# Patient Record
Sex: MALE | Race: WHITE | Employment: OTHER | URBAN - METROPOLITAN AREA
[De-identification: names, ages, dates, MRNs, and addresses within clinical notes are randomized per-mention and may not be internally consistent; named-entity substitution may affect disease eponyms.]

---

## 2022-04-07 PROBLEM — I10 HYPERTENSION: Status: ACTIVE | Noted: 2022-04-07

## 2022-04-07 PROBLEM — I21.9 MYOCARDIAL INFARCT (HCC): Status: RESOLVED | Noted: 2022-04-07 | Resolved: 2022-04-07

## 2022-04-07 PROBLEM — Z79.4 TYPE 2 DIABETES MELLITUS, WITH LONG-TERM CURRENT USE OF INSULIN (HCC): Status: ACTIVE | Noted: 2022-04-07

## 2022-04-07 PROBLEM — Z79.4 TYPE 2 DIABETES MELLITUS, WITH LONG-TERM CURRENT USE OF INSULIN (HCC): Chronic | Status: ACTIVE | Noted: 2022-04-07

## 2022-04-07 PROBLEM — N40.0 BPH (BENIGN PROSTATIC HYPERPLASIA): Status: ACTIVE | Noted: 2022-04-07

## 2022-04-07 PROBLEM — K21.9 GERD (GASTROESOPHAGEAL REFLUX DISEASE): Chronic | Status: ACTIVE | Noted: 2022-04-07

## 2022-04-07 PROBLEM — I21.9 MYOCARDIAL INFARCT (HCC): Status: ACTIVE | Noted: 2022-04-07

## 2022-04-07 PROBLEM — I25.10 CAD (CORONARY ARTERY DISEASE): Status: ACTIVE | Noted: 2022-04-07

## 2022-04-07 PROBLEM — E11.9 TYPE 2 DIABETES MELLITUS, WITH LONG-TERM CURRENT USE OF INSULIN (HCC): Status: ACTIVE | Noted: 2022-04-07

## 2022-04-07 PROBLEM — E11.9 TYPE 2 DIABETES MELLITUS, WITH LONG-TERM CURRENT USE OF INSULIN (HCC): Chronic | Status: ACTIVE | Noted: 2022-04-07

## 2022-04-07 PROBLEM — N18.4 CKD (CHRONIC KIDNEY DISEASE) STAGE 4, GFR 15-29 ML/MIN (HCC): Status: ACTIVE | Noted: 2022-04-07

## 2022-04-07 PROBLEM — K21.9 GERD (GASTROESOPHAGEAL REFLUX DISEASE): Chronic | Status: RESOLVED | Noted: 2022-04-07 | Resolved: 2022-04-07

## 2022-04-07 PROBLEM — K21.9 GERD (GASTROESOPHAGEAL REFLUX DISEASE): Status: ACTIVE | Noted: 2022-04-07

## 2022-04-07 PROBLEM — N18.4 CKD (CHRONIC KIDNEY DISEASE) STAGE 4, GFR 15-29 ML/MIN (HCC): Chronic | Status: ACTIVE | Noted: 2022-04-07

## 2022-04-07 PROBLEM — E78.2 MIXED HYPERLIPIDEMIA: Chronic | Status: ACTIVE | Noted: 2022-04-07

## 2022-04-07 PROBLEM — N40.0 BPH (BENIGN PROSTATIC HYPERPLASIA): Chronic | Status: ACTIVE | Noted: 2022-04-07

## 2022-04-07 PROBLEM — E78.2 MIXED HYPERLIPIDEMIA: Status: ACTIVE | Noted: 2022-04-07

## 2022-04-07 PROBLEM — I10 HYPERTENSION: Chronic | Status: ACTIVE | Noted: 2022-04-07

## 2022-04-07 PROBLEM — D64.9 ANEMIA: Chronic | Status: ACTIVE | Noted: 2022-04-07

## 2022-04-07 PROBLEM — N28.1 CYST OF LEFT KIDNEY: Status: ACTIVE | Noted: 2022-04-07

## 2022-04-07 PROBLEM — I25.10 CAD (CORONARY ARTERY DISEASE): Chronic | Status: ACTIVE | Noted: 2022-04-07

## 2022-04-07 PROBLEM — R11.10 CHRONIC VOMITING: Status: ACTIVE | Noted: 2022-04-07

## 2022-04-07 PROBLEM — D64.9 ANEMIA: Status: ACTIVE | Noted: 2022-04-07

## 2022-05-19 PROBLEM — R10.11 RUQ ABDOMINAL PAIN: Status: ACTIVE | Noted: 2022-05-19

## 2022-05-25 PROBLEM — Z79.4 INSULIN LONG-TERM USE (HCC): Chronic | Status: ACTIVE | Noted: 2022-05-25

## 2022-06-01 PROBLEM — N18.6 ESRD (END STAGE RENAL DISEASE) (HCC): Status: ACTIVE | Noted: 2022-06-01

## 2022-07-14 PROBLEM — T85.691A MECHANICAL COMPLICATION DUE TO PERITONEAL DIALYSIS CATHETER (HCC): Status: ACTIVE | Noted: 2022-07-14

## 2022-10-06 PROBLEM — I21.A1 TYPE 2 MYOCARDIAL INFARCTION (HCC): Status: ACTIVE | Noted: 2022-04-07

## 2023-01-31 ENCOUNTER — HOSPITAL ENCOUNTER (OUTPATIENT)
Dept: CARDIAC CATH/INVASIVE PROCEDURES | Age: 53
Discharge: HOME | End: 2023-02-01
Attending: INTERNAL MEDICINE | Admitting: INTERNAL MEDICINE
Payer: COMMERCIAL

## 2023-01-31 DIAGNOSIS — J06.9 UPPER RESPIRATORY TRACT INFECTION, UNSPECIFIED TYPE: ICD-10-CM

## 2023-01-31 DIAGNOSIS — R05.1 ACUTE COUGH: ICD-10-CM

## 2023-01-31 DIAGNOSIS — R11.10 CHRONIC VOMITING: ICD-10-CM

## 2023-01-31 DIAGNOSIS — E78.2 MIXED HYPERLIPIDEMIA: Chronic | ICD-10-CM

## 2023-01-31 DIAGNOSIS — I25.10 CORONARY ARTERY DISEASE INVOLVING NATIVE HEART WITHOUT ANGINA PECTORIS, UNSPECIFIED VESSEL OR LESION TYPE: Chronic | ICD-10-CM

## 2023-01-31 PROBLEM — Z95.5 S/P PRIMARY ANGIOPLASTY WITH CORONARY STENT: Status: ACTIVE | Noted: 2023-01-31

## 2023-01-31 LAB
ACTIVATED CLOTTING TIME: 271 SEC (ref 79–149)
EGFR, POC: 11 ML/MIN/1.73M2
GLUCOSE BLD-MCNC: 103 MG/DL (ref 75–110)
GLUCOSE BLD-MCNC: 138 MG/DL (ref 74–100)
GLUCOSE BLD-MCNC: 223 MG/DL (ref 75–110)
HCT VFR BLD CALC: 45.7 % (ref 40.7–50.3)
HEMOGLOBIN: 14.4 G/DL (ref 13–17)
PLATELET # BLD: 244 K/UL (ref 138–453)
POC BUN: 42 MG/DL (ref 8–26)
POC CHLORIDE: 108 MMOL/L (ref 98–107)
POC CREATININE: 6.04 MG/DL (ref 0.51–1.19)
POC HEMATOCRIT: 49 % (ref 41–53)
POC HEMOGLOBIN: 16.5 G/DL (ref 13.5–17.5)
POC POTASSIUM: 4.2 MMOL/L (ref 3.5–4.5)
POC SODIUM: 140 MMOL/L (ref 138–146)

## 2023-01-31 PROCEDURE — 82435 ASSAY OF BLOOD CHLORIDE: CPT

## 2023-01-31 PROCEDURE — 99153 MOD SED SAME PHYS/QHP EA: CPT

## 2023-01-31 PROCEDURE — 7100000001 HC PACU RECOVERY - ADDTL 15 MIN

## 2023-01-31 PROCEDURE — 85018 HEMOGLOBIN: CPT

## 2023-01-31 PROCEDURE — 85347 COAGULATION TIME ACTIVATED: CPT

## 2023-01-31 PROCEDURE — C1894 INTRO/SHEATH, NON-LASER: HCPCS

## 2023-01-31 PROCEDURE — 6360000002 HC RX W HCPCS

## 2023-01-31 PROCEDURE — 84132 ASSAY OF SERUM POTASSIUM: CPT

## 2023-01-31 PROCEDURE — 80048 BASIC METABOLIC PNL TOTAL CA: CPT

## 2023-01-31 PROCEDURE — 99152 MOD SED SAME PHYS/QHP 5/>YRS: CPT

## 2023-01-31 PROCEDURE — 84295 ASSAY OF SERUM SODIUM: CPT

## 2023-01-31 PROCEDURE — 7100000000 HC PACU RECOVERY - FIRST 15 MIN

## 2023-01-31 PROCEDURE — 6370000000 HC RX 637 (ALT 250 FOR IP): Performed by: STUDENT IN AN ORGANIZED HEALTH CARE EDUCATION/TRAINING PROGRAM

## 2023-01-31 PROCEDURE — C1874 STENT, COATED/COV W/DEL SYS: HCPCS

## 2023-01-31 PROCEDURE — 6360000002 HC RX W HCPCS: Performed by: STUDENT IN AN ORGANIZED HEALTH CARE EDUCATION/TRAINING PROGRAM

## 2023-01-31 PROCEDURE — 92921 HC PRQ CARDIAC ANGIO ADDL ART: CPT

## 2023-01-31 PROCEDURE — C1760 CLOSURE DEV, VASC: HCPCS

## 2023-01-31 PROCEDURE — C1725 CATH, TRANSLUMIN NON-LASER: HCPCS

## 2023-01-31 PROCEDURE — 36415 COLL VENOUS BLD VENIPUNCTURE: CPT

## 2023-01-31 PROCEDURE — 6360000004 HC RX CONTRAST MEDICATION

## 2023-01-31 PROCEDURE — 85014 HEMATOCRIT: CPT

## 2023-01-31 PROCEDURE — 93458 L HRT ARTERY/VENTRICLE ANGIO: CPT

## 2023-01-31 PROCEDURE — 84484 ASSAY OF TROPONIN QUANT: CPT

## 2023-01-31 PROCEDURE — 6370000000 HC RX 637 (ALT 250 FOR IP)

## 2023-01-31 PROCEDURE — 92928 PRQ TCAT PLMT NTRAC ST 1 LES: CPT

## 2023-01-31 PROCEDURE — 85049 AUTOMATED PLATELET COUNT: CPT

## 2023-01-31 PROCEDURE — C1887 CATHETER, GUIDING: HCPCS

## 2023-01-31 PROCEDURE — 82565 ASSAY OF CREATININE: CPT

## 2023-01-31 PROCEDURE — 2709999900 HC NON-CHARGEABLE SUPPLY

## 2023-01-31 PROCEDURE — C1892 INTRO/SHEATH,FIXED,PEEL-AWAY: HCPCS

## 2023-01-31 PROCEDURE — C1769 GUIDE WIRE: HCPCS

## 2023-01-31 PROCEDURE — 82947 ASSAY GLUCOSE BLOOD QUANT: CPT

## 2023-01-31 PROCEDURE — 84520 ASSAY OF UREA NITROGEN: CPT

## 2023-01-31 RX ORDER — INSULIN LISPRO 100 [IU]/ML
0-4 INJECTION, SOLUTION INTRAVENOUS; SUBCUTANEOUS NIGHTLY
Status: DISCONTINUED | OUTPATIENT
Start: 2023-01-31 | End: 2023-02-01 | Stop reason: HOSPADM

## 2023-01-31 RX ORDER — INSULIN LISPRO 100 [IU]/ML
0-8 INJECTION, SOLUTION INTRAVENOUS; SUBCUTANEOUS
Status: DISCONTINUED | OUTPATIENT
Start: 2023-02-01 | End: 2023-02-01 | Stop reason: HOSPADM

## 2023-01-31 RX ORDER — TIMOLOL MALEATE 5 MG/ML
1 SOLUTION/ DROPS OPHTHALMIC DAILY
Status: DISCONTINUED | OUTPATIENT
Start: 2023-02-01 | End: 2023-02-01 | Stop reason: HOSPADM

## 2023-01-31 RX ORDER — SODIUM CHLORIDE 0.9 % (FLUSH) 0.9 %
5-40 SYRINGE (ML) INJECTION PRN
Status: DISCONTINUED | OUTPATIENT
Start: 2023-01-31 | End: 2023-02-01 | Stop reason: HOSPADM

## 2023-01-31 RX ORDER — SEVELAMER CARBONATE 800 MG/1
800 TABLET, FILM COATED ORAL
Status: DISCONTINUED | OUTPATIENT
Start: 2023-02-01 | End: 2023-02-01 | Stop reason: HOSPADM

## 2023-01-31 RX ORDER — FUROSEMIDE 40 MG/1
40 TABLET ORAL NIGHTLY
Status: DISCONTINUED | OUTPATIENT
Start: 2023-01-31 | End: 2023-02-01 | Stop reason: HOSPADM

## 2023-01-31 RX ORDER — NITROGLYCERIN 0.4 MG/1
0.4 TABLET SUBLINGUAL EVERY 5 MIN PRN
Status: DISCONTINUED | OUTPATIENT
Start: 2023-01-31 | End: 2023-02-01 | Stop reason: HOSPADM

## 2023-01-31 RX ORDER — CARVEDILOL 25 MG/1
25 TABLET ORAL 2 TIMES DAILY
Status: DISCONTINUED | OUTPATIENT
Start: 2023-01-31 | End: 2023-02-01 | Stop reason: HOSPADM

## 2023-01-31 RX ORDER — SODIUM CHLORIDE 0.9 % (FLUSH) 0.9 %
5-40 SYRINGE (ML) INJECTION EVERY 12 HOURS SCHEDULED
Status: DISCONTINUED | OUTPATIENT
Start: 2023-01-31 | End: 2023-02-01 | Stop reason: HOSPADM

## 2023-01-31 RX ORDER — VITAMIN B COMPLEX
5000 TABLET ORAL DAILY
Status: DISCONTINUED | OUTPATIENT
Start: 2023-02-01 | End: 2023-02-01 | Stop reason: HOSPADM

## 2023-01-31 RX ORDER — SODIUM CHLORIDE 9 MG/ML
INJECTION, SOLUTION INTRAVENOUS CONTINUOUS
Status: DISCONTINUED | OUTPATIENT
Start: 2023-01-31 | End: 2023-02-01 | Stop reason: HOSPADM

## 2023-01-31 RX ORDER — OMEGA-3-ACID ETHYL ESTERS 1 G/1
1 CAPSULE, LIQUID FILLED ORAL DAILY
Status: DISCONTINUED | OUTPATIENT
Start: 2023-02-01 | End: 2023-02-01 | Stop reason: HOSPADM

## 2023-01-31 RX ORDER — SPIRONOLACTONE 25 MG/1
25 TABLET ORAL DAILY
Status: DISCONTINUED | OUTPATIENT
Start: 2023-02-01 | End: 2023-02-01 | Stop reason: HOSPADM

## 2023-01-31 RX ORDER — ASPIRIN 81 MG/1
81 TABLET ORAL DAILY
Status: DISCONTINUED | OUTPATIENT
Start: 2023-02-01 | End: 2023-02-01 | Stop reason: HOSPADM

## 2023-01-31 RX ORDER — HYDRALAZINE HYDROCHLORIDE 20 MG/ML
10 INJECTION INTRAMUSCULAR; INTRAVENOUS EVERY 6 HOURS PRN
Status: DISCONTINUED | OUTPATIENT
Start: 2023-01-31 | End: 2023-02-01 | Stop reason: HOSPADM

## 2023-01-31 RX ORDER — PANTOPRAZOLE SODIUM 40 MG/1
40 TABLET, DELAYED RELEASE ORAL DAILY
Status: DISCONTINUED | OUTPATIENT
Start: 2023-02-01 | End: 2023-02-01 | Stop reason: HOSPADM

## 2023-01-31 RX ORDER — INSULIN GLARGINE 100 [IU]/ML
35 INJECTION, SOLUTION SUBCUTANEOUS NIGHTLY
Status: DISCONTINUED | OUTPATIENT
Start: 2023-01-31 | End: 2023-02-01 | Stop reason: HOSPADM

## 2023-01-31 RX ORDER — DEXTROSE MONOHYDRATE 100 MG/ML
INJECTION, SOLUTION INTRAVENOUS CONTINUOUS PRN
Status: DISCONTINUED | OUTPATIENT
Start: 2023-01-31 | End: 2023-02-01 | Stop reason: HOSPADM

## 2023-01-31 RX ORDER — ONDANSETRON 2 MG/ML
4 INJECTION INTRAMUSCULAR; INTRAVENOUS EVERY 6 HOURS PRN
Status: DISCONTINUED | OUTPATIENT
Start: 2023-01-31 | End: 2023-02-01 | Stop reason: HOSPADM

## 2023-01-31 RX ORDER — CLOPIDOGREL BISULFATE 75 MG/1
75 TABLET ORAL DAILY
Status: DISCONTINUED | OUTPATIENT
Start: 2023-02-01 | End: 2023-02-01 | Stop reason: HOSPADM

## 2023-01-31 RX ORDER — FUROSEMIDE 40 MG/1
80 TABLET ORAL DAILY
Status: DISCONTINUED | OUTPATIENT
Start: 2023-02-01 | End: 2023-02-01 | Stop reason: HOSPADM

## 2023-01-31 RX ORDER — NIFEDIPINE 90 MG/1
90 TABLET, EXTENDED RELEASE ORAL DAILY
Status: DISCONTINUED | OUTPATIENT
Start: 2023-02-01 | End: 2023-02-01 | Stop reason: HOSPADM

## 2023-01-31 RX ORDER — ATORVASTATIN CALCIUM 80 MG/1
80 TABLET, FILM COATED ORAL DAILY
Status: DISCONTINUED | OUTPATIENT
Start: 2023-01-31 | End: 2023-02-01 | Stop reason: HOSPADM

## 2023-01-31 RX ORDER — SODIUM CHLORIDE 9 MG/ML
INJECTION, SOLUTION INTRAVENOUS PRN
Status: DISCONTINUED | OUTPATIENT
Start: 2023-01-31 | End: 2023-02-01 | Stop reason: HOSPADM

## 2023-01-31 RX ADMIN — SODIUM CHLORIDE: 9 INJECTION, SOLUTION INTRAVENOUS at 12:08

## 2023-01-31 RX ADMIN — CARVEDILOL 25 MG: 25 TABLET, FILM COATED ORAL at 23:00

## 2023-01-31 RX ADMIN — INSULIN GLARGINE 35 UNITS: 100 INJECTION, SOLUTION SUBCUTANEOUS at 23:06

## 2023-01-31 RX ADMIN — HYDROMORPHONE HYDROCHLORIDE 0.25 MG: 1 INJECTION, SOLUTION INTRAMUSCULAR; INTRAVENOUS; SUBCUTANEOUS at 23:06

## 2023-01-31 RX ADMIN — FUROSEMIDE 40 MG: 40 TABLET ORAL at 23:00

## 2023-01-31 ASSESSMENT — PAIN - FUNCTIONAL ASSESSMENT: PAIN_FUNCTIONAL_ASSESSMENT: PREVENTS OR INTERFERES SOME ACTIVE ACTIVITIES AND ADLS

## 2023-01-31 ASSESSMENT — PAIN SCALES - GENERAL
PAINLEVEL_OUTOF10: 8
PAINLEVEL_OUTOF10: 0

## 2023-01-31 ASSESSMENT — PAIN DESCRIPTION - LOCATION: LOCATION: BACK;GROIN

## 2023-01-31 ASSESSMENT — PAIN DESCRIPTION - ONSET: ONSET: PROGRESSIVE

## 2023-01-31 ASSESSMENT — PAIN DESCRIPTION - PAIN TYPE: TYPE: SURGICAL PAIN

## 2023-01-31 ASSESSMENT — PAIN DESCRIPTION - DESCRIPTORS: DESCRIPTORS: DISCOMFORT

## 2023-01-31 ASSESSMENT — PAIN DESCRIPTION - FREQUENCY: FREQUENCY: CONTINUOUS

## 2023-01-31 ASSESSMENT — PAIN DESCRIPTION - ORIENTATION: ORIENTATION: RIGHT

## 2023-01-31 NOTE — H&P
Attestation signed by      Attending Physician Statement:    I have discussed the care of  Carly Soliman , including pertinent history and exam findings, with the Cardiology fellow/resident. I have seen and examined the patient and the key elements of all parts of the encounter have been performed by me. I agree with the assessment, plan and orders as documented by the fellow/resident, after I modified exam findings and plan of treatments, and the final version is my approved version of the assessment. Additional Comments: [facility]    Consult / H&P               Today's Date: 1/31/2023  Patient Name: Carly Soliman  Date of admission: No admission date for patient encounter. Patient's age: 47 y. o., 1970  Admission Dx: No admission diagnoses are documented for this encounter. Reason for Consult:  Cardiac evaluation    Requesting Physician: No admitting provider for patient encounter. CHIEF COMPLAINT:  Elective cath     History Obtained From:  patient    HISTORY OF PRESENT ILLNESS:      The patient is a 47 y.o. is here for elective Cath/PCI       Past Medical History:   has a past medical history of Anemia, BPH (benign prostatic hyperplasia), CAD (coronary artery disease), Cerebral artery occlusion with cerebral infarction ([address] Chronic vomiting, CKD (chronic kidney disease) stage 4, GFR 15-29 ml/min ([address] Cyst of left kidney, Hypertension, Injection of surface of eye, right, Mixed hyperlipidemia, Pneumonia, and Type 2 diabetes mellitus, with long-term current use of insulin ([address] Past Surgical History:   has a past surgical history that includes Cholecystectomy; Carpal tunnel release (Left); vascular surgery (N/A, 05/26/2022); Dialysis Catheter Insertion (N/A, 06/21/2022); Dialysis Catheter Insertion (N/A, 07/18/2022); and Cardiac catheterization (10/03/2022).      Home Medications:    Prior to Admission medications    Medication Sig Start Date End Date Taking? Authorizing Provider   NIFEdipine (ADALAT CC) 90 MG extended release tablet Take by mouth daily 1/23/23   Malina Little DO   sacubitril-valsartan (ENTRESTO)  MG per tablet Take 1 tablet by mouth 2 times daily 1/16/23   Lakshmi Moscoso MD   spironolactone (ALDACTONE) 25 MG tablet Take 1 tablet by mouth daily 12/27/22   Lakshmi Moscoso MD   atorvastatin (LIPITOR) 80 MG tablet Take 1 tablet by mouth daily 12/27/22   Lakshmi Moscoso MD   clopidogrel (PLAVIX) 75 MG tablet Take 1 tablet by mouth daily 12/27/22   TELLO Downing CNP   insulin aspart (NOVOLOG FLEXPEN) 100 UNIT/ML injection pen use per sliding scale three times a day with meals.   Maximum 30 units/day 12/27/22   TELLO Downing CNP   insulin detemir (LEVEMIR FLEXTOUCH) 100 UNIT/ML injection pen Inject 35 Units into the skin nightly 12/27/22   TELLO Downing CNP   Insulin Pen Needle (BD PEN NEEDLE BILL U/F) 32G X 4 MM MISC 1 each by Does not apply route daily 12/27/22   TELLO Downing CNP   isosorbide mononitrate (IMDUR) 30 MG extended release tablet Take 3 tablets by mouth daily 12/27/22   TELLO Downing CNP   pantoprazole (PROTONIX) 40 MG tablet Take 1 tablet by mouth daily 12/27/22   TELLO Downing CNP   furosemide (LASIX) 40 MG tablet Take 40 mg by mouth nightly    Malina Little DO   Icosapent Ethyl (VASCEPA) 1 g CAPS capsule Take 2 capsules by mouth 2 times daily 12/5/22   Lakshmi Moscoso MD   carvedilol (COREG) 25 MG tablet Take 1 tablet by mouth 2 times daily 11/10/22   Lakshmi Moscoso MD   vitamin D3 (CHOLECALCIFEROL) 125 MCG (5000 UT) TABS tablet Take 1 capsule by mouth as needed    Historical Provider, MD   albuterol sulfate HFA (VENTOLIN HFA) 108 (90 Base) MCG/ACT inhaler Inhale 1-2 puffs into the lungs every 4 hours as needed for Wheezing or Shortness of Breath 10/25/22   TELLO Downing - CNP   b complex-C-folic acid (NEPHROCAPS) 1 MG capsule Take 1 mg by mouth daily 10/5/22   Historical Provider, MD   nitroGLYCERIN (NITROSTAT) 0.4 MG SL tablet Place 1 (ONE) tablet under tongue every 5 (FIVE) minutes as needed for Chest pain. max = 3 (THREE) doses. If chest pain persists after FIRST dose, call 911 10/4/22   Historical Provider, MD   BD PEN NEEDLE MICRO U/F 32G X 6 MM MISC  8/31/22   Historical Provider, MD   ACCU-CHEK GUIDE strip USE TO TEST BLOOD SUGAR FOUR TIMES DAILY AND AS NEEDED 5/25/22   Historical Provider, MD   timolol (TIMOPTIC) 0.5 % ophthalmic solution  5/12/22   Len Villar   calcitRIOL (ROCALTROL) 0.5 MCG capsule Take 0.5 mcg by mouth Monday through Friday    Historical Provider, MD   sevelamer (RENVELA) 800 MG tablet Take 1 tablet by mouth 3 times daily (with meals) 3 Tablets TID    Historical Provider, MD   Accu-Chek Softclix Lancets MISC  4/7/22   Historical Provider, MD   furosemide (LASIX) 80 MG tablet Take 80 mg by mouth daily Taking 120 mg total a day-an 80 mg-am and 40 mg-pm 4/12/22   Benahili U Iboaya, DO   aspirin 81 MG EC tablet Take 81 mg by mouth daily    Historical Provider, MD      No current facility-administered medications for this encounter. Allergies:  Amoxicillin, Oxycodone, Oxycodone hcl, Percocet [oxycodone-acetaminophen], Amoxicillin-pot clavulanate, and Cephalexin    Social History:   reports that he has never smoked. He quit smokeless tobacco use about 3 years ago. His smokeless tobacco use included chew. He reports that he does not drink alcohol and does not use drugs. Family History: family history includes Colon Cancer in his brother. No h/o sudden cardiac death. No for premature CAD    REVIEW OF SYSTEMS:    Constitutional: there has been no unanticipated weight loss. There's been No change in energy level, No change in activity level. Eyes: No visual changes or diplopia. No scleral icterus.   ENT: No Headaches  Cardiovascular: No cardiac history  Respiratory: No previous pulmonary problems, No cough  Gastrointestinal: No abdominal pain. No change in bowel or bladder habits. Genitourinary: No dysuria, trouble voiding, or hematuria. Musculoskeletal:  No gait disturbance, No weakness or joint complaints. Integumentary: No rash or pruritis. Neurological: No headache, diplopia, change in muscle strength, numbness or tingling. No change in gait, balance, coordination, mood, affect, memory, mentation, behavior. Psychiatric: No anxiety, or depression. Endocrine: No temperature intolerance. No excessive thirst, fluid intake, or urination. No tremor. Hematologic/Lymphatic: No abnormal bruising or bleeding, blood clots or swollen lymph nodes. Allergic/Immunologic: No nasal congestion or hives. PHYSICAL EXAM:      There were no vitals taken for this visit. Constitutional and General Appearance: alert, cooperative, no distress and appears stated age  [de-identified]: PERRL, no cervical lymphadenopathy. No masses palpable. Normal oral mucosa  Respiratory:  Normal excursion and expansion without use of accessory muscles  Resp Auscultation: Good respiratory effort. No for increased work of breathing. On auscultation: clear to auscultation bilaterally  Cardiovascular: The apical impulse is not displaced  Heart tones are crisp and normal. regular S1 and S2.  Jugular venous pulsation Normal  The carotid upstroke is normal in amplitude and contour without delay or bruit  Peripheral pulses are symmetrical and full   Abdomen:   No masses or tenderness  Bowel sounds present  Extremities:   No Cyanosis or Clubbing   Lower extremity edema: No   Skin: Warm and dry  Neurological:  Alert and oriented. Moves all extremities well  No abnormalities of mood, affect, memory, mentation, or behavior are noted    DATA:    \  Labs:     CBC: No results for input(s): WBC, HGB, HCT, PLT in the last 72 hours. BMP: No results for input(s): NA, K, CO2, BUN, CREATININE, LABGLOM, GLUCOSE in the last 72 hours.   BNP: No results for input(s): BNP in the last 72 hours. PT/INR: No results for input(s): PROTIME, INR in the last 72 hours. APTT:No results for input(s): APTT in the last 72 hours. CARDIAC ENZYMES:No results for input(s): CKTOTAL, CKMB, CKMBINDEX, TROPONINI in the last 72 hours. FASTING LIPID PANEL:  Lab Results   Component Value Date/Time    HDL 34 12/27/2022 10:49 AM    LDLCALC 136 12/27/2022 10:49 AM    TRIG 278 12/27/2022 10:49 AM     LIVER PROFILE:No results for input(s): AST, ALT, LABALBU in the last 72 hours. IMPRESSION:    Patient Active Problem List   Diagnosis    Type 2 diabetes mellitus, with long-term current use of insulin ([address]    Hypertension    Coronary artery disease involving native coronary artery of native heart with other form of angina pectoris ([address]    Mixed hyperlipidemia    CKD (chronic kidney disease) stage 4, GFR 15-29 ml/min ([facility])    Type 2 myocardial infarction ([facility])    Anemia    Cyst of left kidney    BPH (benign prostatic hyperplasia)    Chronic vomiting    RUQ abdominal pain    Insulin long-term use ([address]    ESRD (end stage renal disease) ([address]    Mechanical complication due to peritoneal dialysis catheter [facility])     CAD s/p MI October 2022 with LAD PCI     -[facility] 10/3/22:  LM distal 20% stenosis. LAD mid 80% stenosis s/p XIENCE 2.5 x 18mm ZAC. D1 very small in size with moderate diffuse disease. D2 moderate in size with mild diffuse disease in addition to 60% stenosis. D3 small in size with severe diffuse diease. LCx mid 70% diffuse stenosis. Attempted LCx PCI but unable due to significant tortuosity via radial access, recommend femoral access if plan for LCx PCI in future. OM1 small in size with moderate diffuse disease. OM2 very small size with mild diffuse disease. OM3 60% and 50% stenotic lesions noted. RCA large with prox 20% stenosis, mid 30% stenosis, mid to distal 40% stenosis, and distal 30% stenosis. RPDA 50% and 60% stenotic lesions noted. LVEDP 18mmHg. No AV gradient.      Ischemic Cardiomyopathy  -Appears fairly euvolemic   -2D ECHO 9/30/22:  LV mildly enlarged with normal wall thickness. Severe global hypokinesis. Estimated LVEF 20-24%. Grade III LV DDx. Normal RV size with low normal systolic function. Negative agitated saline study for IAS. AV mean gradient: 2 mmHg, DI 0.76, and ZOE (Vmax) 2.73cm2. No pericardial effusion.     -Rest as discussed above     HTN  -Uncontrolled but improved from prior     HLD  -9/16/22:  //HDL 42/  -9/30/22:  /LDL (direct) 101/HDL 36/     ESRD on PD  -Cr 4.6 with K 4.0 (10/4/22)  -Management per Dr Blanca Zapata from Nephrology     CVA  -Patient reports having diagnosed with CVA around the time he was diagnosed with CAD  -No gross residual deficit noted  -Left carotid bruit on exam  -Carotid Duplex 10/5/22:  Bilateral 1-49% stenosis. -Management per PCP     DM  -HbA1c 9.2% (9/30/22)  -Management per PCP     Anemia  -Hb 10.6 (10/2/22)  -NC and NC in nature  -Management per PCP         Discussed with patient and Nurse.     Electronically signed by Aleksandar Bond MD on 1/31/2023 at [address] [place] [address] [facility]      [phone]

## 2023-01-31 NOTE — OP NOTE
[facility]    CARDIAC CATHETERIZATION    Date:   1/31/2023  Patient name:  Renee Perea  Date of admission:  1/31/2023 10:53 AM  MRN:   [id number]  YOB: 1970    Operators:  Primary:   Sharmaine Mendez MD (Attending Physician)    Procedure performed:       [x] Left Heart Catheterization. [] Graft Angiography. [x] Left Ventriculography. [] Right Heart Catheterization. [x] Coronary Angiography. [] Aortic Valve Studies. [x] PCI:      [] Other:       Pre Procedure Conscious Sedation Data:  ASA Class:    [] I [x] II [] III [] IV    Mallampati Class:  [] I [x] II [] III [] IV      Indication:  [] STEMI      [] + Stress test  [] ACS      [] + EKG Changes  [] Non Q MI       [x] Significant Risk Factors  [x] Recurrent Angina             [] Diabetes Mellitus    [] New LBBB      [] Uncontrolled HTN. [] CHF / Low EF changes     [] Abnormal CTA / Ca Score      Procedure:  Access:  [x] Femoral  [] Radial  artery       [x] Right  [] Left    Procedure: After informed consent was obtained with explanation of the risks and benefits, patient was brought to the cath lab. The access area was prepped and draped in sterile fashion. 1% lidocaine was used for local block. The artery was cannulated with 6  Fr sheath with brisk arterial blood return. The side port was frequently flushed and aspirated with normal saline. Findings:  Angiographic Findings       Cardiac Arteries and Lesion Findings       LMCA: Normal 0% stenosis. LAD: Patent mid stent area   D1: very small     LCx: Mid area 80-90% stenosis   OM2: Good size with proximal 805 stenosis       Lesion on Mid CX: Mid subsection. 95% stenosis 20 mm length reduced to 0%. Pre procedure CHELO III flow was noted. Post Procedure CHELO III flow was present. Good runoff was present. The lesion was diagnosed as Moderate Risk (B). Devices used         - Luge Wire 182 cm. Number of passes: 1.         - Euphora Balloon 2.5mm x 30mm.  2 inflation(s) to a max pressure of: 10     martha. - Resolute Pedro 2.5 x 38 ZAC. 1 inflation(s) to a max pressure of: 12     martha. Lesion on 2nd [address]: Proximal subsection. 85% stenosis 10 mm length reduced to 0%. Pre procedure CHELO II flow was noted. Post Procedure CHELO III flow was present. Good runoff was present. The lesion was diagnosed as High Risk (C). Devices used         - Euphora Balloon 2.5mm x 30mm. 2 inflation(s) to a max pressure of: 10     martha. RCA: Mild irregularities 20-30%. PDA: mid area 50% stenosis     Coronary Tree       Dominance: Right         Estimated Blood Loss: 10 mL     Conclusions        Procedure Summary        Patent LAD stents from 2022    Severe disease in mid LCX and OM2 (Failed attempt PCI in [place])    Successful PTCA -ZCA of LCX / OM        Recommendations        Post stent protocol   ____________________________________________________________________    History and Risk Factors    [x] Hypertension     [] Family history of CAD  [x] Hyperlipidemia     [] Cerebrovascular Disease   [x] Prior MI       [] Peripheral Vascular disease   [x] Prior PCI              [x] Diabetes Mellitus    [] Left Main PCI. [x] Currently on Dialysis. [] Prior CABG. [] Currently smoker. [] Cardiac Arrest outside of healthcare facility. [] Yes    [x] No        Witnessed     [] Yes   [] No     Arrest after arrival of EMS  [] Yes   [] No     [] Cardiac Arrest at other Facility. [] Yes   [x] No    Pre-Procedure Information. Heart Failure       [] Yes    [x] No        Class  [] I      [] II  [] III    [] IV. New Diagnosis    [] Yes  [] No    HF Type      [] Systolic   [] Diastolic          [] Unknown. Diagnostic Test:   EKG       [] Normal   [x] Abnormal    New antiarrhythmia medications:    [] Yes   [x] No   New onset atrial fibrillation / Flutter     [] Yes   [x] No   ECG Abnormalities:      [] V. Fib   [] Margy V.  Tach           [] NS V. T   [] New LBBB [] T. Inv  []  ST dev > 0.5 mm         [] PVC's freq  [] PVC's infrequent    Stress Test Performed:      [] Yes    [x] No     Type:     [] Stress Echo   [] Exercise Stress Test (no imaging)      [] Stress Nuclear  [] Stress Imaging     Results   [] Negative   [] Positive        [] Indeterminate  [] Unavailable     If Positive/ Risk / Extent of Ischemia:       [] Low  [] Intermediate         [] High  [] Unavailable      Cardiac CTA Performed:     [] Yes    [x] No      Results   [] CAD   [] Non obstructive CAD      [] No CAD   [] Uncertain      [] Unknown   [] Structural Disease. Pre Procedure Medications:   [x] Yes    [] No         [x] ASA   [x] Beta Blockers      [x] Nitrate   [x] Ca Channel Blockers      [] Ranolazine   [x] Statin       [x] Plavix/Others antiplatelets      Electronically signed on 1/31/2023 at 4:03 PM by:    Hemant Lopez MD  Fellow, [id number]     I have reviewed the case / procedure with resident / fellow  I have examined the patient personally  Patient agree with treatment plan as discussed before, final arrangement based on my evaluation and exam.    Risk and benefit of procedure planned were explained in details. Procedure was performed by me personally, with all aspect of the procedure being done using standard protocol. Note was modified based on my own assessment and treatment.     Isabel Boas, MD  [facility]

## 2023-01-31 NOTE — PROGRESS NOTES
Received post cardiac cath procedure to [facility] room 7. Assessment obtained. Restrictions reviewed with patient. Post procedure pathway initiated. Right groin site soft , Fem-o-stop in place and hematoma outlined. Family at side. Patient without complaints. Head of bed flat with right leg straight.

## 2023-01-31 NOTE — PROGRESS NOTES
Patient admitted, consent signed and questions answered. Patient ready for procedure. Call light to reach with side rails up 2 of 2. Bilateral groin and right wrist clipped with writer and Saran Howard RN present. Family at bedside with patient. History and physical completed.

## 2023-01-31 NOTE — H&P
Attestation signed by      Attending Physician Statement:    I have discussed the care of  Corinne Villafana , including pertinent history and exam findings, with the Cardiology fellow/resident. I have seen and examined the patient and the key elements of all parts of the encounter have been performed by me. I agree with the assessment, plan and orders as documented by the fellow/resident, after I modified exam findings and plan of treatments, and the final version is my approved version of the assessment. Additional Comments: [facility] Cardiology Cardiology    Consult / H&P               Today's Date: 1/31/2023  Patient Name: Corinne Villafana  Date of admission: 1/31/2023 10:53 AM  Patient's age: 47 y. o., 1970  Admission Dx: [address] [H54.8]    Reason for Consult:  Cardiac evaluation    Requesting Physician: No admitting provider for patient encounter. CHIEF COMPLAINT:  Elective cath     History Obtained From:  patient    HISTORY OF PRESENT ILLNESS:      The patient is a 47 y.o. is here for elective Cath/PCI       Past Medical History:   has a past medical history of Anemia, BPH (benign prostatic hyperplasia), CAD (coronary artery disease), Cerebral artery occlusion with cerebral infarction ([address] Chronic vomiting, CKD (chronic kidney disease) stage 4, GFR 15-29 ml/min ([address] Cyst of left kidney, Hypertension, Injection of surface of eye, right, Mixed hyperlipidemia, Pneumonia, and Type 2 diabetes mellitus, with long-term current use of insulin ([address] Past Surgical History:   has a past surgical history that includes Cholecystectomy; Carpal tunnel release (Left); vascular surgery (N/A, 05/26/2022); Dialysis Catheter Insertion (N/A, 06/21/2022); Dialysis Catheter Insertion (N/A, 07/18/2022); Cardiac catheterization (10/03/2022); and Cardiac catheterization (01/31/2023). Home Medications:    Prior to Admission medications    Medication Sig Start Date End Date Taking?  Authorizing Provider   NIFEdipine (ADALAT CC) 90 MG extended release tablet Take by mouth daily 1/23/23   Malina Little DO   sacubitril-valsartan (ENTRESTO)  MG per tablet Take 1 tablet by mouth 2 times daily 1/16/23   Cris Mann MD   spironolactone (ALDACTONE) 25 MG tablet Take 1 tablet by mouth daily 12/27/22   Cris Mann MD   atorvastatin (LIPITOR) 80 MG tablet Take 1 tablet by mouth daily 12/27/22   Cris Mann MD   clopidogrel (PLAVIX) 75 MG tablet Take 1 tablet by mouth daily 12/27/22   TELLO Downing CNP   insulin aspart (NOVOLOG FLEXPEN) 100 UNIT/ML injection pen use per sliding scale three times a day with meals.   Maximum 30 units/day 12/27/22   TELLO Downing CNP   insulin detemir (LEVEMIR FLEXTOUCH) 100 UNIT/ML injection pen Inject 35 Units into the skin nightly 12/27/22   TELLO Downing CNP   Insulin Pen Needle (BD PEN NEEDLE BILL U/F) 32G X 4 MM MISC 1 each by Does not apply route daily 12/27/22   TELLO Downing CNP   isosorbide mononitrate (IMDUR) 30 MG extended release tablet Take 3 tablets by mouth daily 12/27/22   TELLO Downing CNP   pantoprazole (PROTONIX) 40 MG tablet Take 1 tablet by mouth daily 12/27/22   TELLO Downing CNP   furosemide (LASIX) 40 MG tablet Take 40 mg by mouth nightly    Malina Little DO   Icosapent Ethyl (VASCEPA) 1 g CAPS capsule Take 2 capsules by mouth 2 times daily 12/5/22   Cris Mann MD   carvedilol (COREG) 25 MG tablet Take 1 tablet by mouth 2 times daily 11/10/22   Cris Mann MD   vitamin D3 (CHOLECALCIFEROL) 125 MCG (5000 UT) TABS tablet Take 1 capsule by mouth as needed    Historical Provider, MD   albuterol sulfate HFA (VENTOLIN HFA) 108 (90 Base) MCG/ACT inhaler Inhale 1-2 puffs into the lungs every 4 hours as needed for Wheezing or Shortness of Breath 10/25/22   TELLO Downing - CNP   b complex-C-folic acid (NEPHROCAPS) 1 MG capsule Take 1 mg by mouth daily 10/5/22 Historical Provider, MD   nitroGLYCERIN (NITROSTAT) 0.4 MG SL tablet Place 1 (ONE) tablet under tongue every 5 (FIVE) minutes as needed for Chest pain. max = 3 (THREE) doses. If chest pain persists after FIRST dose, call 911 10/4/22   Historical Provider, MD   BD PEN NEEDLE MICRO U/F 32G X 6 MM MISC  8/31/22   Historical Provider, MD   ACCU-CHEHEIDI GUIDE strip USE TO TEST BLOOD SUGAR FOUR TIMES DAILY AND AS NEEDED 5/25/22   Historical Provider, MD   timolol (TIMOPTIC) 0.5 % ophthalmic solution  5/12/22   Marian Skaggs   calcitRIOL (ROCALTROL) 0.5 MCG capsule Take 0.5 mcg by mouth Monday through Friday    Historical Provider, MD   sevelamer (RENVELA) 800 MG tablet Take 1 tablet by mouth 3 times daily (with meals) 3 Tablets TID    Historical Provider, MD [place] Softclix Lancets MISC  4/7/22   Historical Provider, MD   furosemide (LASIX) 80 MG tablet Take 80 mg by mouth daily Taking 120 mg total a day-an 80 mg-am and 40 mg-pm 4/12/22   Benahili U Iboaya, DO   aspirin 81 MG EC tablet Take 81 mg by mouth daily    Historical Provider, MD      Current Facility-Administered Medications: 0.9 % sodium chloride infusion, , IntraVENous, Continuous    Allergies:  Amoxicillin, Oxycodone, Oxycodone hcl, Percocet [oxycodone-acetaminophen], Amoxicillin-pot clavulanate, and Cephalexin    Social History:   reports that he has never smoked. He quit smokeless tobacco use about 3 years ago. His smokeless tobacco use included chew. He reports that he does not drink alcohol and does not use drugs. Family History: family history includes Colon Cancer in his brother. No h/o sudden cardiac death. No for premature CAD    REVIEW OF SYSTEMS:    Constitutional: there has been no unanticipated weight loss. There's been No change in energy level, No change in activity level. Eyes: No visual changes or diplopia. No scleral icterus.   ENT: No Headaches  Cardiovascular: No cardiac history  Respiratory: No previous pulmonary problems, No cough  Gastrointestinal: No abdominal pain. No change in bowel or bladder habits. Genitourinary: No dysuria, trouble voiding, or hematuria. Musculoskeletal:  No gait disturbance, No weakness or joint complaints. Integumentary: No rash or pruritis. Neurological: No headache, diplopia, change in muscle strength, numbness or tingling. No change in gait, balance, coordination, mood, affect, memory, mentation, behavior. Psychiatric: No anxiety, or depression. Endocrine: No temperature intolerance. No excessive thirst, fluid intake, or urination. No tremor. Hematologic/Lymphatic: No abnormal bruising or bleeding, blood clots or swollen lymph nodes. Allergic/Immunologic: No nasal congestion or hives. PHYSICAL EXAM:      /74   Pulse 82   Temp 98.2 °F (36.8 °C) (Oral)   Resp 13   Ht 5' 4\" (1.626 m)   Wt 189 lb (85.7 kg)   SpO2 97%   BMI 32.44 kg/m²    Constitutional and General Appearance: alert, cooperative, no distress and appears stated age  HEENT: PERRL, no cervical lymphadenopathy. No masses palpable. Normal oral mucosa  Respiratory:  Normal excursion and expansion without use of accessory muscles  Resp Auscultation: Good respiratory effort. No for increased work of breathing. On auscultation: clear to auscultation bilaterally  Cardiovascular: The apical impulse is not displaced  Heart tones are crisp and normal. regular S1 and S2.  Jugular venous pulsation Normal  The carotid upstroke is normal in amplitude and contour without delay or bruit  Peripheral pulses are symmetrical and full   Abdomen:   No masses or tenderness  Bowel sounds present  Extremities:   No Cyanosis or Clubbing   Lower extremity edema: No   Skin: Warm and dry  Neurological:  Alert and oriented.   Moves all extremities well  No abnormalities of mood, affect, memory, mentation, or behavior are noted    DATA:    \  Labs:     CBC:   Recent Labs     01/31/23  1256        BMP:   Recent Labs     01/31/23  1205 CREATININE 6.04*     BNP: No results for input(s): BNP in the last 72 hours. PT/INR: No results for input(s): PROTIME, INR in the last 72 hours. APTT:No results for input(s): APTT in the last 72 hours. CARDIAC ENZYMES:No results for input(s): CKTOTAL, CKMB, CKMBINDEX, TROPONINI in the last 72 hours. FASTING LIPID PANEL:  Lab Results   Component Value Date/Time    HDL 34 12/27/2022 10:49 AM    LDLCALC 136 12/27/2022 10:49 AM    TRIG 278 12/27/2022 10:49 AM     LIVER PROFILE:No results for input(s): AST, ALT, LABALBU in the last 72 hours. IMPRESSION:    Patient Active Problem List   Diagnosis    Type 2 diabetes mellitus, with long-term current use of insulin ([address]    Hypertension    Coronary artery disease involving native coronary artery of native heart with other form of angina pectoris ([address]    Mixed hyperlipidemia    CKD (chronic kidney disease) stage 4, GFR 15-29 ml/min ([facility])    Type 2 myocardial infarction (HCC)    Anemia    Cyst of left kidney    BPH (benign prostatic hyperplasia)    Chronic vomiting    RUQ abdominal pain    Insulin long-term use ([address]    ESRD (end stage renal disease) ([address]    Mechanical complication due to peritoneal dialysis catheter [facility])     CAD s/p MI October 2022 with LAD PCI     -[facility] 10/3/22:  LM distal 20% stenosis. LAD mid 80% stenosis s/p XIENCE 2.5 x 18mm ZAC. D1 very small in size with moderate diffuse disease. D2 moderate in size with mild diffuse disease in addition to 60% stenosis. D3 small in size with severe diffuse diease. LCx mid 70% diffuse stenosis. Attempted LCx PCI but unable due to significant tortuosity via radial access, recommend femoral access if plan for LCx PCI in future. OM1 small in size with moderate diffuse disease. OM2 very small size with mild diffuse disease. OM3 60% and 50% stenotic lesions noted. RCA large with prox 20% stenosis, mid 30% stenosis, mid to distal 40% stenosis, and distal 30% stenosis.   RPDA 50% and 60% stenotic lesions noted.  LVEDP 18mmHg. No AV gradient. Ischemic Cardiomyopathy  -Appears fairly euvolemic   -2D ECHO 9/30/22:  LV mildly enlarged with normal wall thickness. Severe global hypokinesis. Estimated LVEF 20-24%. Grade III LV DDx. Normal RV size with low normal systolic function. Negative agitated saline study for IAS. AV mean gradient: 2 mmHg, DI 0.76, and ZOE (Vmax) 2.73cm2. No pericardial effusion.     -Rest as discussed above     HTN  -Uncontrolled but improved from prior     HLD  -9/16/22:  //HDL 42/  -9/30/22:  /LDL (direct) 101/HDL 36/     ESRD on PD  -Cr 4.6 with K 4.0 (10/4/22)  -Management per Dr Bianca Love from Nephrology     CVA  -Patient reports having diagnosed with CVA around the time he was diagnosed with CAD  -No gross residual deficit noted  -Left carotid bruit on exam  -Carotid Duplex 10/5/22:  Bilateral 1-49% stenosis.   -Management per PCP     DM  -HbA1c 9.2% (9/30/22)  -Management per PCP     Anemia  -Hb 10.6 (10/2/22)  -NC and NC in nature  -Management per PCP     Isabel Reyes MD  Fellow, [address] [place] [address]

## 2023-02-01 VITALS
RESPIRATION RATE: 18 BRPM | SYSTOLIC BLOOD PRESSURE: 143 MMHG | OXYGEN SATURATION: 93 % | HEART RATE: 108 BPM | TEMPERATURE: 98.3 F | WEIGHT: 188.93 LBS | BODY MASS INDEX: 32.26 KG/M2 | DIASTOLIC BLOOD PRESSURE: 87 MMHG | HEIGHT: 64 IN

## 2023-02-01 LAB
ALBUMIN SERPL-MCNC: 3.7 G/DL (ref 3.5–5.2)
ALBUMIN/GLOBULIN RATIO: 1.4 (ref 1–2.5)
ALP SERPL-CCNC: 107 U/L (ref 40–129)
ALT SERPL-CCNC: 17 U/L (ref 5–41)
ANION GAP SERPL CALCULATED.3IONS-SCNC: 15 MMOL/L (ref 9–17)
ANION GAP SERPL CALCULATED.3IONS-SCNC: 18 MMOL/L (ref 9–17)
AST SERPL-CCNC: 11 U/L
BILIRUB SERPL-MCNC: 0.3 MG/DL (ref 0.3–1.2)
BUN SERPL-MCNC: 43 MG/DL (ref 6–20)
BUN SERPL-MCNC: 45 MG/DL (ref 6–20)
CALCIUM SERPL-MCNC: 8.6 MG/DL (ref 8.6–10.4)
CALCIUM SERPL-MCNC: 8.8 MG/DL (ref 8.6–10.4)
CHLORIDE SERPL-SCNC: 102 MMOL/L (ref 98–107)
CHLORIDE SERPL-SCNC: 102 MMOL/L (ref 98–107)
CO2 SERPL-SCNC: 18 MMOL/L (ref 20–31)
CO2 SERPL-SCNC: 19 MMOL/L (ref 20–31)
CREAT SERPL-MCNC: 5.45 MG/DL (ref 0.7–1.2)
CREAT SERPL-MCNC: 6.06 MG/DL (ref 0.7–1.2)
EKG ATRIAL RATE: 91 BPM
EKG P AXIS: 25 DEGREES
EKG P-R INTERVAL: 136 MS
EKG Q-T INTERVAL: 388 MS
EKG QRS DURATION: 100 MS
EKG QTC CALCULATION (BAZETT): 477 MS
EKG R AXIS: -45 DEGREES
EKG T AXIS: 41 DEGREES
EKG VENTRICULAR RATE: 91 BPM
GFR SERPL CREATININE-BSD FRML MDRD: 10 ML/MIN/1.73M2
GFR SERPL CREATININE-BSD FRML MDRD: 12 ML/MIN/1.73M2
GLUCOSE BLD-MCNC: 166 MG/DL (ref 75–110)
GLUCOSE BLD-MCNC: 172 MG/DL (ref 75–110)
GLUCOSE SERPL-MCNC: 168 MG/DL (ref 70–99)
GLUCOSE SERPL-MCNC: 202 MG/DL (ref 70–99)
HCT VFR BLD AUTO: 43.7 % (ref 40.7–50.3)
HGB BLD-MCNC: 14.2 G/DL (ref 13–17)
MAGNESIUM SERPL-MCNC: 1.5 MG/DL (ref 1.6–2.6)
MCH RBC QN AUTO: 28.6 PG (ref 25.2–33.5)
MCHC RBC AUTO-ENTMCNC: 32.5 G/DL (ref 28.4–34.8)
MCV RBC AUTO: 87.9 FL (ref 82.6–102.9)
NRBC AUTOMATED: 0 PER 100 WBC
PDW BLD-RTO: 14.6 % (ref 11.8–14.4)
PHOSPHATE SERPL-MCNC: 5 MG/DL (ref 2.5–4.5)
PLATELET # BLD AUTO: 239 K/UL (ref 138–453)
PMV BLD AUTO: 10.6 FL (ref 8.1–13.5)
POTASSIUM SERPL-SCNC: 4.3 MMOL/L (ref 3.7–5.3)
POTASSIUM SERPL-SCNC: 4.4 MMOL/L (ref 3.7–5.3)
PROT SERPL-MCNC: 6.3 G/DL (ref 6.4–8.3)
RBC # BLD: 4.97 M/UL (ref 4.21–5.77)
SODIUM SERPL-SCNC: 136 MMOL/L (ref 135–144)
SODIUM SERPL-SCNC: 138 MMOL/L (ref 135–144)
WBC # BLD AUTO: 7.8 K/UL (ref 3.5–11.3)

## 2023-02-01 PROCEDURE — 36415 COLL VENOUS BLD VENIPUNCTURE: CPT

## 2023-02-01 PROCEDURE — 85027 COMPLETE CBC AUTOMATED: CPT

## 2023-02-01 PROCEDURE — 82947 ASSAY GLUCOSE BLOOD QUANT: CPT

## 2023-02-01 PROCEDURE — 2580000003 HC RX 258: Performed by: STUDENT IN AN ORGANIZED HEALTH CARE EDUCATION/TRAINING PROGRAM

## 2023-02-01 PROCEDURE — 93005 ELECTROCARDIOGRAM TRACING: CPT | Performed by: STUDENT IN AN ORGANIZED HEALTH CARE EDUCATION/TRAINING PROGRAM

## 2023-02-01 PROCEDURE — 84100 ASSAY OF PHOSPHORUS: CPT

## 2023-02-01 PROCEDURE — 83735 ASSAY OF MAGNESIUM: CPT

## 2023-02-01 PROCEDURE — 80053 COMPREHEN METABOLIC PANEL: CPT

## 2023-02-01 PROCEDURE — 93010 ELECTROCARDIOGRAM REPORT: CPT | Performed by: INTERNAL MEDICINE

## 2023-02-01 PROCEDURE — 6370000000 HC RX 637 (ALT 250 FOR IP): Performed by: STUDENT IN AN ORGANIZED HEALTH CARE EDUCATION/TRAINING PROGRAM

## 2023-02-01 RX ORDER — NITROGLYCERIN 0.4 MG/1
TABLET SUBLINGUAL
Qty: 25 TABLET | Refills: 3 | Status: SHIPPED | OUTPATIENT
Start: 2023-02-01

## 2023-02-01 RX ORDER — CALCITRIOL 0.5 UG/1
0.5 CAPSULE, LIQUID FILLED ORAL DAILY
Qty: 30 CAPSULE | Refills: 3 | Status: SHIPPED | OUTPATIENT
Start: 2023-02-01

## 2023-02-01 RX ORDER — FUROSEMIDE 40 MG/1
40 TABLET ORAL NIGHTLY
Qty: 60 TABLET | Refills: 3 | Status: SHIPPED | OUTPATIENT
Start: 2023-02-01

## 2023-02-01 RX ORDER — NIFEDIPINE 90 MG/1
90 TABLET, FILM COATED, EXTENDED RELEASE ORAL DAILY
Qty: 30 TABLET | Refills: 3 | Status: SHIPPED | OUTPATIENT
Start: 2023-02-01

## 2023-02-01 RX ORDER — ATORVASTATIN CALCIUM 80 MG/1
80 TABLET, FILM COATED ORAL DAILY
Qty: 90 TABLET | Refills: 3 | Status: SHIPPED | OUTPATIENT
Start: 2023-02-01

## 2023-02-01 RX ORDER — ISOSORBIDE MONONITRATE 30 MG/1
90 TABLET, EXTENDED RELEASE ORAL DAILY
Qty: 270 TABLET | Refills: 0 | Status: SHIPPED | OUTPATIENT
Start: 2023-02-01

## 2023-02-01 RX ORDER — ICOSAPENT ETHYL 1000 MG/1
2 CAPSULE ORAL 2 TIMES DAILY
Qty: 60 CAPSULE | Refills: 3 | Status: SHIPPED | OUTPATIENT
Start: 2023-02-01

## 2023-02-01 RX ORDER — CLOPIDOGREL BISULFATE 75 MG/1
75 TABLET ORAL DAILY
Qty: 90 TABLET | Refills: 0 | Status: SHIPPED | OUTPATIENT
Start: 2023-02-01

## 2023-02-01 RX ORDER — SPIRONOLACTONE 25 MG/1
25 TABLET ORAL DAILY
Qty: 90 TABLET | Refills: 3 | Status: SHIPPED | OUTPATIENT
Start: 2023-02-01

## 2023-02-01 RX ORDER — CHOLECALCIFEROL (VITAMIN D3) 10 MCG
1 TABLET ORAL DAILY
Qty: 30 CAPSULE | Refills: 3 | Status: SHIPPED | OUTPATIENT
Start: 2023-02-01

## 2023-02-01 RX ORDER — FUROSEMIDE 80 MG
80 TABLET ORAL DAILY
Qty: 60 TABLET | Refills: 3 | Status: SHIPPED | OUTPATIENT
Start: 2023-02-01

## 2023-02-01 RX ORDER — CARVEDILOL 25 MG/1
25 TABLET ORAL 2 TIMES DAILY
Qty: 180 TABLET | Refills: 3 | Status: SHIPPED | OUTPATIENT
Start: 2023-02-01

## 2023-02-01 RX ORDER — SEVELAMER CARBONATE 800 MG/1
1 TABLET, FILM COATED ORAL
Qty: 90 TABLET | Refills: 3 | Status: SHIPPED | OUTPATIENT
Start: 2023-02-01

## 2023-02-01 RX ORDER — PANTOPRAZOLE SODIUM 40 MG/1
40 TABLET, DELAYED RELEASE ORAL DAILY
Qty: 90 TABLET | Refills: 0 | Status: SHIPPED | OUTPATIENT
Start: 2023-02-01

## 2023-02-01 RX ADMIN — SEVELAMER CARBONATE 800 MG: 800 TABLET, FILM COATED ORAL at 08:51

## 2023-02-01 RX ADMIN — SODIUM CHLORIDE, PRESERVATIVE FREE 10 ML: 5 INJECTION INTRAVENOUS at 09:10

## 2023-02-01 RX ADMIN — SACUBITRIL AND VALSARTAN 1 TABLET: 97; 103 TABLET, FILM COATED ORAL at 08:52

## 2023-02-01 RX ADMIN — SPIRONOLACTONE 25 MG: 25 TABLET ORAL at 08:51

## 2023-02-01 RX ADMIN — Medication 81 MG: at 08:52

## 2023-02-01 RX ADMIN — PANTOPRAZOLE SODIUM 40 MG: 40 TABLET, DELAYED RELEASE ORAL at 08:51

## 2023-02-01 RX ADMIN — Medication 5000 UNITS: at 08:52

## 2023-02-01 RX ADMIN — CLOPIDOGREL 75 MG: 75 TABLET, FILM COATED ORAL at 08:52

## 2023-02-01 RX ADMIN — SEVELAMER CARBONATE 800 MG: 800 TABLET, FILM COATED ORAL at 13:04

## 2023-02-01 RX ADMIN — FUROSEMIDE 80 MG: 40 TABLET ORAL at 08:52

## 2023-02-01 RX ADMIN — SACUBITRIL AND VALSARTAN 1 TABLET: 97; 103 TABLET, FILM COATED ORAL at 00:21

## 2023-02-01 RX ADMIN — OMEGA-3-ACID ETHYL ESTERS 1 G: 1 CAPSULE, LIQUID FILLED ORAL at 08:51

## 2023-02-01 RX ADMIN — ISOSORBIDE MONONITRATE 90 MG: 30 TABLET, EXTENDED RELEASE ORAL at 08:52

## 2023-02-01 RX ADMIN — CARVEDILOL 25 MG: 25 TABLET, FILM COATED ORAL at 08:52

## 2023-02-01 RX ADMIN — TIMOLOL MALEATE 1 DROP: 5 SOLUTION OPHTHALMIC at 08:52

## 2023-02-01 RX ADMIN — NIFEDIPINE 90 MG: 90 TABLET, FILM COATED, EXTENDED RELEASE ORAL at 08:51

## 2023-02-01 ASSESSMENT — PAIN SCALES - GENERAL: PAINLEVEL_OUTOF10: 0

## 2023-02-01 NOTE — PLAN OF CARE
Problem: Chronic Conditions and Co-morbidities  Goal: Patient's chronic conditions and co-morbidity symptoms are monitored and maintained or improved  2/1/2023 0947 by Rashaun Ching RN  Outcome: Progressing  2/1/2023 0607 by Angel Macias RN  Outcome: Progressing     Problem: Discharge Planning  Goal: Discharge to home or other facility with appropriate resources  2/1/2023 0947 by Rashaun Ching RN  Outcome: Progressing  2/1/2023 0607 by Angel Macias RN  Outcome: Progressing     Problem: ABCDS Injury Assessment  Goal: Absence of physical injury  2/1/2023 0947 by Rashaun Ching RN  Outcome: Progressing  2/1/2023 0607 by Angel Macias RN  Outcome: Progressing     Problem: Pain  Goal: Verbalizes/displays adequate comfort level or baseline comfort level  2/1/2023 0947 by Rashaun Ching RN  Outcome: Progressing  2/1/2023 0607 by Angel Macias RN  Outcome: Progressing

## 2023-02-01 NOTE — PROGRESS NOTES
Dr Faraz Jean-Baptiste updated via telephone about patients hematoma and femo-stop. New orders received for  H&H. Will continue to monitor closely.

## 2023-02-01 NOTE — PROGRESS NOTES
Fem-Stop removed from pts. Right groin cath site. Site remains outlined from previous hematoma growth. Site is softening to touch, no longer oozing at this time. Will leave open to air and continuously check groin for hematoma and bleeding. Pt. Educated and advised to hold light pressure to site when coughing or sneezing and to remain flat in bed with no bending right leg. Also advised to call out immediately if he feels a popping sensation to right groin site, warmth, wetness or bleeding. Pt. Verbalized understanding.

## 2023-02-01 NOTE — PROGRESS NOTES
Pt. Having pain in his lower back and right groin cath site from lying supine for so long due to right groin hematoma s/p heart cath. Writer spoke with Dr. Deng Hutton via telephone. Dr. Deng Hutton placed orders for Dilaudid to be given. See epic for orders.

## 2023-02-01 NOTE — PROGRESS NOTES
Pt. Assissted out of bed to restroom with writer. . Assissted back to bed with assist.  Groin reassessed. Right groin remains soft to touch. Call light within reach, telemetry remains intact.

## 2023-02-01 NOTE — DISCHARGE INSTRUCTIONS
Discharge Instructions for angiogram  [address] 62 to shower in AM. Discontinue band aid in AM.   Do not apply further band aids. Keep clean, dry and open to air. No powder or lotion. Do not soak in a pool or tub and do not swim for one week. If there is any bleeding at the catheter site, apply firm pressure with your hands until the bleeding stops. If bleeding continues after 3 minutes call 911. If there is any swelling or firm areas at your puncture site, this could be bleeding under the skin(hematoma), and if you have any concerns seek help immediately. Drink plenty of fluids after the test. This will flush the x-ray dye from your system. Return to your normal diet. The sedative will make you sleepy. Rest until the effects have worn off. Ask your doctor when you will be able to return to work. Avoid heavy lifting objects greater than 10  pounds, physically demanding activities, and sexual activity for 5-7 days. Your activity will also depend upon where the catheter was inserted: Do not sit for long periods of time. Try to change positions frequently. Medications   If advised by your doctor, resume taking your normal medicines. Use acetaminophen (Tylenol) for pain relief. Do not take metformin (Glucophage) or glyburide and metformin (Glucovance) for 48 hours after the test.    If you had to stop taking these medications before the procedure, ask your doctor when you can resume taking them:   If youAnti-inflammatory drugs (eg, ibuprofen )   Blood thinners, such as warfarin (Coumadin)   Clopidogrel (Plavix)   If you are taking medicines, follow these general guidelines:   Take your medicine as directed. Do not change the amount or the schedule. Do not stop taking them without talking to your doctor. Do not share them. Know what the results and side effects. Report them to your doctor. Some drugs can be dangerous when mixed.  Talk to a doctor or pharmacist if you are taking more than one drug. This includes over-the-counter medicine and herb or dietary supplements. Plan ahead for refills so you don't run out. Call Your Doctor If Any of the Following Occurs     :   Signs of infection, including fever and chills   Redness, swelling, increasing pain, excessive bleeding, or any discharge from the catheter insertion site   CALL 911 if you have symptoms including:   Drooping facial muscles   Changes in vision or speech   Difficulty walking or using your limbs   Change in sensation to affected leg, including numbness, feeling cold, or change in color   Extreme sweating, nausea or vomiting   Dizziness or lightheadedness   Chest pain   Rapid, irregular heartbeat   Palpitations   Cough, shortness of breath, or difficulty breathing   Weakness or fainting   If you think you have an emergency, CALL 911 . Coronary artery disease (CAD) occurs when plaque builds up in the arteries that bring oxygen-rich blood to your heart. Plaque is a fatty substance made of cholesterol, calcium, and other substances in the blood. This process is called hardening of the arteries, or atherosclerosis. What happens when you have coronary artery disease? Plaque may narrow the coronary arteries. Narrowed arteries cause poor blood flow. This can lead to angina symptoms such as chest pain or discomfort. If blood flow is completely blocked, you could have a heart attack. You can slow CAD and reduce the risk of future problems by making changes in your lifestyle. These include quitting smoking and eating heart-healthy foods. Treatments for CAD, along with changes in your lifestyle, can help you live a longer and healthier life. How can you prevent coronary artery disease? Do not smoke. It may be the best thing you can do to prevent heart disease. If you need help quitting, talk to your doctor about stop-smoking programs and medicines.  These can increase your chances of quitting for good. Be active. Get at least 30 minutes of exercise on most days of the week. Walking is a good choice. You also may want to do other activities, such as running, swimming, cycling, or playing tennis or team sports. Eat heart-healthy foods. Eat more fruits and vegetables and less foods that contain saturated and trans fats. Limit alcohol, sodium, and sweets. Stay at a healthy weight. Lose weight if you need to. Manage other health problems such as diabetes, high blood pressure, and high cholesterol. Talk to your doctor about taking a daily aspirin. Manage stress. Stress can hurt your heart. To keep stress low, talk about your problems and feelings. Don't keep your feelings hidden. How is coronary artery disease treated? Your doctor will suggest that you make lifestyle changes. For example, your doctor may ask you to eat healthy foods, quit smoking, lose extra weight, and be more active. You will have to take medicines. Your doctor may suggest a procedure to open narrowed or blocked arteries. This is called angioplasty. Or your doctor may suggest using healthy blood vessels to create detours around narrowed or blocked arteries. This is called bypass surgery. Follow-up care is a key part of your treatment and safety. Be sure to make and go to all appointments, and call your doctor if you are having problems. It's also a good idea to know your test results and keep a list of the medicines you take. Where can you learn more? Go to https://amrita.[employer]. org and sign in to your [facility] account. Enter [phone] in the [facility] box to learn more about Learning About Coronary Artery Disease (CAD).     If you do not have an account, please click on the Sign Up Now link. © 6185-4155 [employer]wise, Inc[employer]. Care instructions adapted under license by [facility] ([facility]).  This care instruction is for use with your licensed healthcare professional. If you have questions about a medical condition or this instruction, always ask your healthcare professional. [place] [address] any warranty or liability for your use of this information. Content Version: 59.0.057780; Current as of: February 20, 2015      Discharge Instructions      SEDATION / ANALGESIA INFORMATION / Clementina Jacqueline 86 have received the sedation/analgesia medication during your visit    Sedation/analgesia is used during short medical procedures under controlled supervision. The medication will produce a strong relaxation. You will be able to hear, speak and follow instructions, but your memory and alertness will be decreased. You will be able to swallow and breathe on your own. During sedation/analgesia your blood pressure, heart and breathing will be watched closely. After the procedure, you may not remember what was said or done. You may have the following effects from the medication. \" Drowsiness, dizziness, sleepiness or confusion. \" Difficulty remembering or delayed reaction times. \" Loss of fine muscle control or difficulty with your balance especially while walking. \" Difficulty focusing or blurred vision. You may not be aware of slight changes in your behavior and/or your reaction time because of the medication used during the procedure. Therefore you should follow these instructions. \" Have someone responsible help you with your care. \" Do not drive for 24 hours. \" Do not operate equipment for 24 hours (lawnmowers, power tools, kitchen accessories, stove). \" Do not drink any alcoholic beverages for a minimum of 24 hours. \" Do not make important personal, legal or business decisions for 24 hours. \" You may experience dizziness or lightheadedness. Move slowly and carefully, do not make sudden position changes. \" Drink extra amounts of fluids today. \" Increase your diet as tolerated (unless you have received specific instructions from your doctor).   \" If you feel nauseated, continue with liquids until the nausea is gone. \" Notify your physician if you have not urinated within 8 hours after the procedure.   \" Resume your medications unless otherwise instructed

## 2023-02-01 NOTE — PROGRESS NOTES
[x] DISCHARGE INSTRUCTIONS GIVEN WITH 2 WEEK APPT. MADE AND . DOCUMENTED   -Pt to call and make appointment    [x] STENT/PCI GUIDELINES GIVEN    [x] ASA  PRESCRIBED POST PROCEDURE  -already on    [x] WAS A BETA BLOCKER ORDERED IF YES WAS SCRIPT GIVEN TO PT.  -already on    [x] WAS PLAVIX,EFFIENT,BRILINTA, ORDERED IF YES WAS SCRIPT GIVEN TO       PT,AND INSTRUCTIONS ON IMPORTANCE OF MED  -already on    [] DOES PT. NEED 30 & 90 DAY SCRIPTS    [] CONCERNS ON PURCHASE OF ANTIPLATELETS IF YES.  SEE PROCEDURE ON      PROCESS FOR PT.S TO OBTAIN THESE MEDS    [x] IS PT. ON STATINS IF NO WERE STATINS ORDERED AND SCRIPT GIVEN  -already on    [x] WERE MEDS RECONCILED, WAS [facility] INFORMATION ON MEDS GIVEN     TO PT.    [x] PRINT DISCHARGE MED LIST CHECK AVS FOR CURRENT MEDS    [x] WAS STENT CARD GIVEN TO PT.

## 2023-02-01 NOTE — PROGRESS NOTES
Notified by nursing staff of noted bleeding/oozing from procedure site. Fem stop applied and monitored. Patient reevaluated by staff at 9 showed suspicion of induration at right femoral procedure site, although no further bleeding noted. H&H obtained showed hemoglobin 14.4. Will monitor overnight. Home medications ordered. Patient also receives PD nightly at home. Nephrology consulted and Dr Nathan Lambert contacted for further recommendation.  Will obtain morning labs and evaluate in the morning    Follow up CBC, CMP, Mg, Ph  Vital signs q4h hours  Monitor on Telemetry  Follow up AM EKG    Will follow    Juventino Novak  [facility] Fellow

## 2023-02-01 NOTE — DISCHARGE SUMMARY
[facility]  Discharge Note                 Name:  Laci Staton  YOB: 1970  Social Security Number:  [id number]  Medical Record Number:  [id number]    Date of Admission:  1/31/2023  Date of Discharge:  2/1/2023    Admitting physician: Betty Larsen MD    Discharge Attending: TELLO Pryor CNP, CNP  Primary Care Physician: TELLO Ibarra CNP  Consultants: Cardiology  Discharge to home in stable condition    HOSPITAL ADMISSION PROBLEM LIST:  Patient Active Problem List   Diagnosis    Type 2 diabetes mellitus, with long-term current use of insulin ([address]    Hypertension    Coronary artery disease involving native coronary artery of native heart with other form of angina pectoris ([address]    Mixed hyperlipidemia    CKD (chronic kidney disease) stage 4, GFR 15-29 ml/min ([facility], [facility])    Type 2 myocardial infarction ([address]    Anemia    Cyst of left kidney    BPH (benign prostatic hyperplasia)    Chronic vomiting    RUQ abdominal pain    Insulin long-term use ([address]    ESRD (end stage renal disease) ([address]    Mechanical complication due to peritoneal dialysis catheter ([address]    S/P primary angioplasty with coronary stent         Procedures:cardiac catheterization    HOSPITAL COURSE :           The patient was admitted for: diagnostic cath/known CAD  Hospital Procedures if any: cath w/ stent placement  Medications changes recommendation: See List  Follow Up Plan: f/u with established cardiologist in 2-3 weeks      Discharge exam:   Vitals:    02/01/23 0848   BP: (!) 161/96   Pulse: (!) 103   Resp: 16   Temp: 97.8 °F (36.6 °C)   SpO2: 95%     Neuro: normal  Chest: Clear to ausculation. No wheezing. Cardiac: Regular rate. s1 and s2 auscultated. No murmur noted. Abdomen/groin: right femoral site has significant ecchymosis but soft and non-tender. Distal pulse +2. Tender but no back pain. No drainage.    Lower extremity edema: none    Discharge Medications:     Medication List CHANGE how you take these medications     calcitRIOL 0.5 MCG capsule; Commonly known as: ROCALTROL; Take 1 capsule   by mouth daily Monday through Friday; What changed: when to take this   NIFEdipine 90 MG extended release tablet; Commonly known as: ADALAT CC;   Take 1 tablet by mouth daily; What changed: how much to take   vitamin D3 125 MCG (5000 UT) Tabs tablet; Commonly known as:   CHOLECALCIFEROL; Take 1 tablet by mouth as needed (per hd); What changed:   reasons to take this     CONTINUE taking these medications     Accu-Chek Guide strip; Generic drug: blood glucose test strips   Accu-Chek Softclix Lancets Misc   albuterol sulfate  (90 Base) MCG/ACT inhaler; Commonly known as:   Ventolin HFA; Inhale 1-2 puffs into the lungs every 4 hours as needed for   Wheezing or Shortness of Breath   aspirin 81 MG EC tablet   atorvastatin 80 MG tablet; Commonly known as: LIPITOR; Take 1 tablet by   mouth daily   b complex-C-folic acid 1 MG capsule; Take 1 capsule by mouth daily   * BD Pen Needle Micro U/F 32G X 6 MM Misc; Generic drug: Insulin Pen   Needle   * BD Pen Needle Lauryn U/F 32G X 4 MM Misc; Generic drug: Insulin Pen   Needle; 1 each by Does not apply route daily   carvedilol 25 MG tablet; Commonly known as: COREG; Take 1 tablet by   mouth 2 times daily   clopidogrel 75 MG tablet; Commonly known as: PLAVIX; Take 1 tablet by   mouth daily   * furosemide 80 MG tablet; Commonly known as: LASIX; Take 1 tablet by   mouth daily Taking 120 mg total a day-an 80 mg-am and 40 mg-pm   * furosemide 40 MG tablet; Commonly known as: LASIX; Take 1 tablet by   mouth nightly   Icosapent Ethyl 1 g Caps capsule; Commonly known as: Vascepa; Take 2   capsules by mouth 2 times daily   isosorbide mononitrate 30 MG extended release tablet; Commonly known as:   IMDUR; Take 3 tablets by mouth daily   Levemir FlexTouch 100 UNIT/ML injection pen; Generic drug: insulin   detemir;  Inject 35 Units into the skin nightly   nitroGLYCERIN 0.4 MG SL tablet; Commonly known as: NITROSTAT; Place 1   (ONE) tablet under tongue every 5 (FIVE) minutes as needed for Chest pain. max = 3 (THREE) doses. If chest pain persists after FIRST dose, call 911   NovoLOG FlexPen 100 UNIT/ML injection pen; Generic drug: insulin aspart;   use per sliding scale three times a day with meals. Maximum 30 units/day   pantoprazole 40 MG tablet; Commonly known as: PROTONIX; Take 1 tablet by   mouth daily   sacubitril-valsartan  MG per tablet; Commonly known as: ENTRESTO;   Take 1 tablet by mouth 2 times daily   sevelamer 800 MG tablet; Commonly known as: RENVELA; Take 1 tablet by   mouth 3 times daily (with meals) 3 Tablets TID   spironolactone 25 MG tablet; Commonly known as: ALDACTONE; Take 1 tablet   by mouth daily   timolol 0.5 % ophthalmic solution; Commonly known as: TIMOPTIC  * This list has 4 medication(s) that are the same as other medications   prescribed for you. Read the directions carefully, and ask your doctor or   other care provider to review them with you. Prior to hospital notes:  CAD s/p MI October 2022 with LAD PCI      [facility] 10/3/22:  LM distal 20% stenosis. LAD mid 80% stenosis s/p XIENCE 2.5 x 18mm ZAC. D1 very small in size with moderate diffuse disease. D2 moderate in size with mild diffuse disease in addition to 60% stenosis. D3 small in size with severe diffuse diease. LCx mid 70% diffuse stenosis. Attempted LCx PCI but unable due to significant tortuosity via radial access, recommend femoral access if plan for LCx PCI in future. OM1 small in size with moderate diffuse disease. OM2 very small size with mild diffuse disease. OM3 60% and 50% stenotic lesions noted. RCA large with prox 20% stenosis, mid 30% stenosis, mid to distal 40% stenosis, and distal 30% stenosis. RPDA 50% and 60% stenotic lesions noted. LVEDP 18mmHg. No AV gradient.      Ischemic Cardiomyopathy  -Appears fairly euvolemic   -2D ECHO 9/30/22:  LV mildly enlarged with normal wall thickness. Severe global hypokinesis. Estimated LVEF 20-24%. Grade III LV DDx. Normal RV size with low normal systolic function. Negative agitated saline study for IAS. AV mean gradient: 2 mmHg, DI 0.76, and ZOE (Vmax) 2.73cm2. No pericardial effusion.     -Rest as discussed above     HTN  -Uncontrolled but improved from prior     HLD  -9/16/22:  //HDL 42/  -9/30/22:  /LDL (direct) 101/HDL 36/     ESRD on PD  -Cr 4.6 with K 4.0 (10/4/22)  -Management per Dr Bianca Love from Nephrology     CVA  -Patient reports having diagnosed with CVA around the time he was diagnosed with CAD  -No gross residual deficit noted  -Left carotid bruit on exam  -Carotid Duplex 10/5/22:  Bilateral 1-49% stenosis. -Management per PCP    Cardiac Cath 1/31/23  Findings:  Angiographic Findings       Cardiac Arteries and Lesion Findings       LMCA: Normal 0% stenosis. LAD: Patent mid stent area   D1: very small     LCx: Mid area 80-90% stenosis   OM2: Good size with proximal 805 stenosis       Lesion on Mid CX: Mid subsection. 95% stenosis 20 mm length reduced to 0%. Pre procedure CHELO III flow was noted. Post Procedure CHELO III flow was present. Good runoff was present. The lesion was diagnosed as Moderate Risk (B). Devices used         - Luge Wire 182 cm. Number of passes: 1.         - Euphora Balloon 2.5mm x 30mm. 2 inflation(s) to a max pressure of: 10     martha. - Resolute Pedro 2.5 x 38 ZAC. 1 inflation(s) to a max pressure of: 12     martha. Lesion on 2nd Ob [place]rina: Proximal subsection. 85% stenosis 10 mm length reduced to 0%. Pre procedure CHELO II flow was noted. Post Procedure CHELO III flow was present. Good runoff was present. The lesion was diagnosed as High Risk (C). Devices used         - Euphora Balloon 2.5mm x 30mm. 2 inflation(s) to a max pressure of: 10     martha. RCA: Mild irregularities 20-30%. PDA: mid area 50% stenosis     Coronary Tree Dominance: Right          Estimated Blood Loss: 10 mL      Conclusions        Procedure Summary        Patent LAD stents from 2022    Severe disease in mid LCX and OM2 (Failed attempt PCI in [place])    Successful PTCA -ZAC of LCX / OM        Recommendations        Post stent protocol     Coronary Discharge Core Measure: Please indicate the medication given by X, and if not the reasons not given:    Not Given Reason  Given      Beta Blockers x      ACE-I No d/t CKD      Statins x      ASA x       OAP (Plavix/Effient/Brilinta) Plavix     SL Nitro   x           Discussed with patient and nursing. Medications and discharge instructions reviewed with patient and nursing. Discussed in detail with patient post cath POC including but not limited to medications, diet, exercise, right femoral artery site care, and follow-up. Questions and concerns addressed. OK for discharge home today. F/U in office in 1-3 weeks.      Electronically signed by TELLO Perez CNP on 2/1/2023 at 10:57 [facility]      [phone]

## 2023-02-01 NOTE — CARE COORDINATION
Case Management Assessment  Initial Evaluation    Date/Time of Evaluation: 2/1/2023 11:14 AM  Assessment Completed by: Lacy Chairez RN    If patient is discharged prior to next notation, then this note serves as note for discharge by case management. Patient Name: Mahsa Azul                   YOB: 1970  Diagnosis: Legal blindness [place] [H54.8]  S/P primary angioplasty with coronary stent [Z95.5]                   Date / Time: 1/31/2023 10:06 PM    Patient Admission Status: Outpatient in a bed   Readmission Risk (Low < 19, Mod (19-27), High > 27): No data recorded  Current PCP: TELLO Benjamin CNP  PCP verified by CM? Chart Reviewed: Yes      History Provided by: (P) Patient  Patient Orientation: (P) Alert and Oriented    Patient Cognition: (P) Alert    Hospitalization in the last 30 days (Readmission):  No    If yes, Readmission Assessment in  Navigator will be completed. Advance Directives:      Code Status: Full Code   Patient's Primary Decision Maker is: Patient Declined (Legal Next of Kin Remains as Decision Maker)      Discharge Planning:    Patient lives with: (P) Spouse/Significant Other Type of Home: (P) House  Primary Care Giver: (P) Self  Patient Support Systems include: (P) Spouse/Significant Other   Current Financial resources:    Current community resources:    Current services prior to admission: (P) None            Current DME:              Type of Home Care services:  (P) None    ADLS  Prior functional level: (P) Independent in ADLs/IADLs  Current functional level: (P) Independent in ADLs/IADLs    PT AM-PAC:   /24  OT AM-PAC:   /24    Family can provide assistance at DC: Would you like Case Management to discuss the discharge plan with any other family members/significant others, and if so, who?     Plans to Return to Present Housing: (P) Yes  Other Identified Issues/Barriers to RETURNING to current housing: none  Potential Assistance needed at discharge: (P) N/A            Potential DME:    Patient expects to discharge to: (P) [facility] for transportation at discharge: (P) Family    Financial    Payor: Carolina Gupta / Plan: Ángel Zuleta LORENE / Product Type: *No Product type* /     Does insurance require precert for SNF: Yes    Potential assistance Purchasing Medications:    Meds-to-Beds request: Yes      [facility] [address]  Phone: [phone] Fax: [phone]      Notes:    Factors facilitating achievement of predicted outcomes: Family support, Cooperative, and Pleasant    Barriers to discharge: Medical complications    Additional Case Management Notes: patient returning home independently with his wife. He denies needs and has transportation home    The Plan for Transition of Care is related to the following treatment goals of Legal blindness [place] [H54.8]  S/P primary angioplasty with coronary stent [J65.7]    IF APPLICABLE: The Patient and/or patient representative Fam Lou and his family were provided with a choice of provider and agrees with the discharge plan. Freedom of choice list with basic dialogue that supports the patient's individualized plan of care/goals and shares the quality data associated with the providers was provided to:     Patient Representative Name:       The Patient and/or Patient Representative Agree with the Discharge Plan?       Laura Gleason RN  Case Management Department  Ph: [phone] Fax: [phone]

## 2023-02-01 NOTE — PROGRESS NOTES
At 2100 writer and second RN in to assess groin, firm hematoma noted below incision again. Manual pressure applied to soften hematoma, femo-stop reapplied and Dr. Doretha Luz notified. Orders for admission to [facility] step down bed ordered, recheck CBC/CMP/EKG in am. No order for any scan at this time.

## 2023-02-02 LAB — TROPONIN I SERPL DL<=0.01 NG/ML-MCNC: 55 NG/L (ref 0–22)

## 2023-03-13 NOTE — PROGRESS NOTES
Cardiac clearance on chart for or 3/14/23 with instructions not to stop plavix/asa. pt and dr Keyonna De Guzman notified.

## 2023-03-14 ENCOUNTER — ANESTHESIA EVENT (OUTPATIENT)
Dept: OPERATING ROOM | Age: 53
End: 2023-03-14
Payer: MEDICARE

## 2023-03-14 ENCOUNTER — HOSPITAL ENCOUNTER (OUTPATIENT)
Age: 53
Setting detail: OUTPATIENT SURGERY
Discharge: HOME | End: 2023-03-14
Attending: OPHTHALMOLOGY | Admitting: OPHTHALMOLOGY
Payer: MEDICARE

## 2023-03-14 ENCOUNTER — ANESTHESIA (OUTPATIENT)
Dept: OPERATING ROOM | Age: 53
End: 2023-03-14
Payer: MEDICARE

## 2023-03-14 VITALS
BODY MASS INDEX: 31.58 KG/M2 | RESPIRATION RATE: 20 BRPM | HEIGHT: 64 IN | TEMPERATURE: 97.3 F | WEIGHT: 185 LBS | DIASTOLIC BLOOD PRESSURE: 75 MMHG | SYSTOLIC BLOOD PRESSURE: 126 MMHG | HEART RATE: 72 BPM | OXYGEN SATURATION: 97 %

## 2023-03-14 PROBLEM — H33.41: Chronic | Status: ACTIVE | Noted: 2023-03-14

## 2023-03-14 LAB
GLUCOSE BLD-MCNC: 123 MG/DL (ref 74–100)
GLUCOSE BLD-MCNC: 98 MG/DL (ref 75–110)
POC POTASSIUM: 4.5 MMOL/L (ref 3.5–4.5)

## 2023-03-14 PROCEDURE — 2500000003 HC RX 250 WO HCPCS: Performed by: OPHTHALMOLOGY

## 2023-03-14 PROCEDURE — 2709999900 HC NON-CHARGEABLE SUPPLY: Performed by: OPHTHALMOLOGY

## 2023-03-14 PROCEDURE — 6370000000 HC RX 637 (ALT 250 FOR IP): Performed by: OPHTHALMOLOGY

## 2023-03-14 PROCEDURE — 2720000010 HC SURG SUPPLY STERILE: Performed by: OPHTHALMOLOGY

## 2023-03-14 PROCEDURE — 2580000003 HC RX 258: Performed by: NURSE ANESTHETIST, CERTIFIED REGISTERED

## 2023-03-14 PROCEDURE — 84132 ASSAY OF SERUM POTASSIUM: CPT

## 2023-03-14 PROCEDURE — 7100000010 HC PHASE II RECOVERY - FIRST 15 MIN: Performed by: OPHTHALMOLOGY

## 2023-03-14 PROCEDURE — 3700000001 HC ADD 15 MINUTES (ANESTHESIA): Performed by: OPHTHALMOLOGY

## 2023-03-14 PROCEDURE — 7100000011 HC PHASE II RECOVERY - ADDTL 15 MIN: Performed by: OPHTHALMOLOGY

## 2023-03-14 PROCEDURE — 3600000014 HC SURGERY LEVEL 4 ADDTL 15MIN: Performed by: OPHTHALMOLOGY

## 2023-03-14 PROCEDURE — 6360000002 HC RX W HCPCS: Performed by: NURSE ANESTHETIST, CERTIFIED REGISTERED

## 2023-03-14 PROCEDURE — 3600000004 HC SURGERY LEVEL 4 BASE: Performed by: OPHTHALMOLOGY

## 2023-03-14 PROCEDURE — 6360000002 HC RX W HCPCS: Performed by: OPHTHALMOLOGY

## 2023-03-14 PROCEDURE — 82947 ASSAY GLUCOSE BLOOD QUANT: CPT

## 2023-03-14 PROCEDURE — 2500000003 HC RX 250 WO HCPCS: Performed by: NURSE ANESTHETIST, CERTIFIED REGISTERED

## 2023-03-14 PROCEDURE — 3700000000 HC ANESTHESIA ATTENDED CARE: Performed by: OPHTHALMOLOGY

## 2023-03-14 RX ORDER — GENTAMICIN SULFATE 40 MG/ML
INJECTION, SOLUTION INTRAMUSCULAR; INTRAVENOUS PRN
Status: DISCONTINUED | OUTPATIENT
Start: 2023-03-14 | End: 2023-03-14 | Stop reason: HOSPADM

## 2023-03-14 RX ORDER — TRIAMCINOLONE ACETONIDE 40 MG/ML
INJECTION, SUSPENSION INTRA-ARTICULAR; INTRAMUSCULAR PRN
Status: DISCONTINUED | OUTPATIENT
Start: 2023-03-14 | End: 2023-03-14 | Stop reason: HOSPADM

## 2023-03-14 RX ORDER — PHENYLEPHRINE HYDROCHLORIDE 100 MG/ML
1 SOLUTION/ DROPS OPHTHALMIC EVERY 5 MIN PRN
Status: COMPLETED | OUTPATIENT
Start: 2023-03-14 | End: 2023-03-14

## 2023-03-14 RX ORDER — CYCLOPENTOLATE HYDROCHLORIDE 10 MG/ML
SOLUTION/ DROPS OPHTHALMIC PRN
Status: DISCONTINUED | OUTPATIENT
Start: 2023-03-14 | End: 2023-03-14 | Stop reason: HOSPADM

## 2023-03-14 RX ORDER — TOBRAMYCIN AND DEXAMETHASONE 3; 1 MG/ML; MG/ML
1 SUSPENSION/ DROPS OPHTHALMIC
Status: COMPLETED | OUTPATIENT
Start: 2023-03-14 | End: 2023-03-14

## 2023-03-14 RX ORDER — SODIUM CHLORIDE 9 MG/ML
INJECTION, SOLUTION INTRAVENOUS PRN
Status: DISCONTINUED | OUTPATIENT
Start: 2023-03-14 | End: 2023-03-14 | Stop reason: HOSPADM

## 2023-03-14 RX ORDER — PROPOFOL 10 MG/ML
INJECTION, EMULSION INTRAVENOUS PRN
Status: DISCONTINUED | OUTPATIENT
Start: 2023-03-14 | End: 2023-03-14 | Stop reason: SDUPTHER

## 2023-03-14 RX ORDER — FENTANYL CITRATE 50 UG/ML
50 INJECTION, SOLUTION INTRAMUSCULAR; INTRAVENOUS EVERY 5 MIN PRN
Status: DISCONTINUED | OUTPATIENT
Start: 2023-03-14 | End: 2023-03-14 | Stop reason: HOSPADM

## 2023-03-14 RX ORDER — SODIUM CHLORIDE 0.9 % (FLUSH) 0.9 %
5-40 SYRINGE (ML) INJECTION PRN
Status: DISCONTINUED | OUTPATIENT
Start: 2023-03-14 | End: 2023-03-14 | Stop reason: HOSPADM

## 2023-03-14 RX ORDER — FENTANYL CITRATE 50 UG/ML
25 INJECTION, SOLUTION INTRAMUSCULAR; INTRAVENOUS EVERY 5 MIN PRN
Status: DISCONTINUED | OUTPATIENT
Start: 2023-03-14 | End: 2023-03-14 | Stop reason: HOSPADM

## 2023-03-14 RX ORDER — LIDOCAINE HYDROCHLORIDE 10 MG/ML
INJECTION, SOLUTION EPIDURAL; INFILTRATION; INTRACAUDAL; PERINEURAL PRN
Status: DISCONTINUED | OUTPATIENT
Start: 2023-03-14 | End: 2023-03-14 | Stop reason: SDUPTHER

## 2023-03-14 RX ORDER — SODIUM CHLORIDE 0.9 % (FLUSH) 0.9 %
5-40 SYRINGE (ML) INJECTION EVERY 12 HOURS SCHEDULED
Status: DISCONTINUED | OUTPATIENT
Start: 2023-03-14 | End: 2023-03-14 | Stop reason: HOSPADM

## 2023-03-14 RX ORDER — ONDANSETRON 2 MG/ML
4 INJECTION INTRAMUSCULAR; INTRAVENOUS
Status: DISCONTINUED | OUTPATIENT
Start: 2023-03-14 | End: 2023-03-14 | Stop reason: HOSPADM

## 2023-03-14 RX ORDER — SODIUM CHLORIDE, SODIUM LACTATE, POTASSIUM CHLORIDE, CALCIUM CHLORIDE 600; 310; 30; 20 MG/100ML; MG/100ML; MG/100ML; MG/100ML
INJECTION, SOLUTION INTRAVENOUS CONTINUOUS PRN
Status: DISCONTINUED | OUTPATIENT
Start: 2023-03-14 | End: 2023-03-14 | Stop reason: SDUPTHER

## 2023-03-14 RX ORDER — CYCLOPENTOLATE HYDROCHLORIDE 10 MG/ML
1 SOLUTION/ DROPS OPHTHALMIC EVERY 5 MIN PRN
Status: COMPLETED | OUTPATIENT
Start: 2023-03-14 | End: 2023-03-14

## 2023-03-14 RX ORDER — ERYTHROMYCIN 5 MG/G
OINTMENT OPHTHALMIC PRN
Status: DISCONTINUED | OUTPATIENT
Start: 2023-03-14 | End: 2023-03-14 | Stop reason: HOSPADM

## 2023-03-14 RX ADMIN — CYCLOPENTOLATE HYDROCHLORIDE 1 DROP: 10 SOLUTION/ DROPS OPHTHALMIC at 07:45

## 2023-03-14 RX ADMIN — LIDOCAINE HYDROCHLORIDE 50 MG: 10 INJECTION, SOLUTION EPIDURAL; INFILTRATION; INTRACAUDAL; PERINEURAL at 08:50

## 2023-03-14 RX ADMIN — PROPOFOL 50 MG: 10 INJECTION, EMULSION INTRAVENOUS at 08:50

## 2023-03-14 RX ADMIN — CYCLOPENTOLATE HYDROCHLORIDE 1 DROP: 10 SOLUTION/ DROPS OPHTHALMIC at 07:39

## 2023-03-14 RX ADMIN — CYCLOPENTOLATE HYDROCHLORIDE 1 DROP: 10 SOLUTION/ DROPS OPHTHALMIC at 07:34

## 2023-03-14 RX ADMIN — PHENYLEPHRINE HYDROCHLORIDE 1 DROP: 100 SOLUTION/ DROPS OPHTHALMIC at 07:34

## 2023-03-14 RX ADMIN — PHENYLEPHRINE HYDROCHLORIDE 1 DROP: 100 SOLUTION/ DROPS OPHTHALMIC at 07:45

## 2023-03-14 RX ADMIN — TOBRAMYCIN AND DEXAMETHASONE 1 DROP: 1; 3 SUSPENSION/ DROPS OPHTHALMIC at 07:45

## 2023-03-14 RX ADMIN — PHENYLEPHRINE HYDROCHLORIDE 1 DROP: 100 SOLUTION/ DROPS OPHTHALMIC at 07:39

## 2023-03-14 RX ADMIN — SODIUM CHLORIDE, POTASSIUM CHLORIDE, SODIUM LACTATE AND CALCIUM CHLORIDE: 600; 310; 30; 20 INJECTION, SOLUTION INTRAVENOUS at 07:50

## 2023-03-14 ASSESSMENT — PAIN - FUNCTIONAL ASSESSMENT: PAIN_FUNCTIONAL_ASSESSMENT: NONE - DENIES PAIN

## 2023-03-14 ASSESSMENT — PAIN SCALES - GENERAL
PAINLEVEL_OUTOF10: 0

## 2023-03-14 ASSESSMENT — LIFESTYLE VARIABLES: SMOKING_STATUS: 0

## 2023-03-14 NOTE — DISCHARGE INSTRUCTIONS
No alcoholic beverages, no driving or operating machinery, no making important decisions for 24 hours. You may have a normal diet but should eat lightly day of surgery. Drink plenty of fluids.   Urinate within 8 hours after surgery, if unable to urinate call your doctor

## 2023-03-14 NOTE — ANESTHESIA POSTPROCEDURE EVALUATION
Department of Anesthesiology  Postprocedure Note    Patient: Chaparro Fox  MRN: [id number]  YOB: 1970  Date of evaluation: 3/14/2023      Procedure Summary     Date: 03/14/23 Room / Location: [address]    Anesthesia Start: 9840 Anesthesia Stop: 0686    Procedure: VITRECTOMY 25 GAUGE, MEMBRANE PEEL, GAS FLUID EXCHANGE, ENDOLASER 200   SPOTS, KENALOG INJECTION (Right) Diagnosis:       Traction detachment of right retina      (TRACTION RETINAL DETACHMENT)    Surgeons: Jenn Chester MD Responsible Provider: Autumn Sauceda MD    Anesthesia Type: MAC ASA Status: 3          Anesthesia Type: No value filed.     Bacilio Phase I:      Bacilio Phase II: Bacilio Score: 10  POST-OP ANESTHESIA NOTE       /75   Pulse 72   Temp 97.3 °F (36.3 °C)   Resp 20   Ht 5' 4\" (1.626 m)   Wt 185 lb (83.9 kg)   SpO2 97%   BMI 31.76 kg/m²    Pain Assessment: 0-10  Pain Level: 0             Anesthesia Post Evaluation    Patient location during evaluation: PACU  Patient participation: complete - patient participated  Level of consciousness: awake  Pain score: 0  Airway patency: patent  Nausea & Vomiting: no nausea and no vomiting  Complications: no  Cardiovascular status: hemodynamically stable  Respiratory status: acceptable  Hydration status: stable

## 2023-03-14 NOTE — BRIEF OP NOTE
Brief Postoperative Note      Patient: Elissa Alatorre  YOB: 1970  MRN: [id number]    Date of Procedure: 3/14/2023    Pre-Op Diagnosis: TRACTION RETINAL DETACHMENT, PDR left eye    Post-Op Diagnosis: Same       Procedure(s):  VITRECTOMY 25 GAUGE, MEMBRANE PEEL, GAS FLUID EXCHANGE, ENDOLASER 200   SPOTS, KENALOG INJECTION    Surgeon(s):  Luna Caicedo MD    Assistant:  0    Anesthesia: Monitor Anesthesia Care    Estimated Blood Loss (mL): Minimal    Complications: None    Specimens:   0    Implants:  0        Electronically signed by Luna Caicedo MD on 3/14/2023 at 10:10 AM

## 2023-03-14 NOTE — OP NOTE
[address] [facility]                  [address] [address]                                OPERATIVE REPORT    PATIENT NAME: Fady Hunter                     :        1970  MED REC NO:   [id number]                             ROOM:  ACCOUNT NO:   [de-identified]                           ADMIT DATE: 2023  PROVIDER:     Tanya Vogel    DATE OF PROCEDURE:  2023    PREOPERATIVE DIAGNOSES:  Proliferative diabetic retinopathy with  vitreous hemorrhage and traction retinal detachment approaching macula,  right eye. POSTOPERATIVE DIAGNOSES:  Proliferative diabetic retinopathy with  vitreous hemorrhage and traction retinal detachment approaching macula,  right eye. PROCEDURE:  Pars plana vitrectomy with extended membrane peeling,  panretinal endolaser and air-fluid exchange, right eye. ANESTHESIA:  Monitored. ESTIMATED BLOOD LOSS:  Minimal.    COMPLICATIONS:  None. SPECIMENS:  None. REASON FOR OPERATION:  The patient is a 67-year-old diabetic who has had  decreasing vision in the right eye. He has a traction retinal  detachment related to atrophic fibrovascular proliferation. The  detachment is nasal as well as along the superotemporal arcade and it is  approaching the macula and getting larger. Macula itself appears to  have edema. The vitreous is partially detached. There is approximately  80% of full panretinal photocoagulation present. The patient has  elected to undergo surgery in hopes of preventing further vision loss. He understands the risks include bleeding, infection, and retinal  detachment. He understands surgery often accelerates cataract growth. OPERATIVE PROCEDURE:  The patient was brought to the operating room in  good condition. He was administered local anesthetic and prepped and  draped in the usual fashion. 25-gauge vitrectomy trocars were placed  through the pars plana in the usual quadrants.   The infusion was  attached to the inferotemporal site. Light pipe and ocutome were placed  through the superior sites. Vitreous was removed from anterior to  posterior. Vitreous was detached superiorly and the posterior vitreous  face was removed where it was detached. There was traction detachment  superior nasal and along the superotemporal arcade. The vitreous was  slowly detached using traction from the ocutome and other areas where  the vitreous was attached. I worked around the traction detachments, so  that there was no bleeding occurring or retinal tear formation. The  vitreous was sequentially detached and then trimmed out past the equator  in all quadrants. There was a flat area of atrophic neovascularization  just outside the arcade in the inferotemporal quadrant. This was  isolated and most of it was peeled. I left a small amount in place. There was minimal bleeding and this was controlled with laser. I  isolated the area of traction detachment and fibrovascular proliferation  along the superotemporal arcade and segmented this. No significant  bleeding occurred in this location. I placed small amount of Kenalog in  the eye and then removed most of it with silicone-tipped extrusion. The  Kenalog did demonstrate a small area of residual vitreous in the  temporal posterior pole into the temporal periphery. This was peeled  with the ocutome. No other residual large membranes were present. I  placed panretinal endolaser in all areas that had not previously been  treated. I did an air-fluid exchange. No retinal holes were identified  and no increase in detachment was seen. The trocars were removed and  the sites were self-sealing. Prior to removing the infusion, the fluid  had been inadvertently turned on. I then replaced two of the superior  trocars and again completed an air-fluid exchange. Trocars removed and  the sites were self-sealing.   50 mg of ceftazidime was injected  sub-Tenon's in the inferotemporal quadrant. The eye was patched in the  usual fashion. The patient was taken to recovery room in good  condition. Ariana Hernández    D: 03/14/2023 12:36:04       T: 03/14/2023 12:39:51     [email or web address]  Job#: [id number]     Doc#: [id number]    CC:

## 2023-03-14 NOTE — ANESTHESIA PRE PROCEDURE
Department of Anesthesiology  Preprocedure Note       Name:  Renee Perea   Age:  47 y.o.  :  1970                                          MRN:  [id number]         Date:  3/14/2023      Surgeon: Nikita Still):  Nitish Cosby MD    Procedure: Procedure(s):  VITRECTOMY 25 GAUGE, GAS FLUID EXCHANGE, ENDOLASER    Medications prior to admission:   Prior to Admission medications    Medication Sig Start Date End Date Taking? Authorizing Provider   Sacubitril-Valsartan (ENTRESTO PO) Take 1 tablet by mouth in the morning and at bedtime ENTRESTO 25-28 MG    Historical Provider, MD   isosorbide mononitrate (IMDUR) 30 MG extended release tablet Take 3 tablets by mouth daily 23   TELLO Johnson CNP   nitroGLYCERIN (NITROSTAT) 0.4 MG SL tablet Place 1 (ONE) tablet under tongue every 5 (FIVE) minutes as needed for Chest pain. max = 3 (THREE) doses.  If chest pain persists after FIRST dose, call 911 23   TELLO Johnson CNP   atorvastatin (LIPITOR) 80 MG tablet Take 1 tablet by mouth daily 23   TELLO Johnson CNP   Icosapent Ethyl (VASCEPA) 1 g CAPS capsule Take 2 capsules by mouth 2 times daily  Patient taking differently: Take 1 capsule by mouth daily 23   TELLO Johnson CNP   carvedilol (COREG) 25 MG tablet Take 1 tablet by mouth 2 times daily 23   TELLO Johnson CNP   NIFEdipine (ADALAT CC) 90 MG extended release tablet Take 1 tablet by mouth daily  Patient taking differently: Take 90 mg by mouth at bedtime 23   TELLO Johnson CNP   furosemide (LASIX) 40 MG tablet Take 1 tablet by mouth nightly  Patient taking differently: Take 90 mg by mouth daily 23   TELLO Johnson CNP   spironolactone (ALDACTONE) 25 MG tablet Take 1 tablet by mouth daily 23   TELLO Johnson CNP   calcitRIOL (ROCALTROL) 0.5 MCG capsule Take 1 capsule by mouth daily Monday through 23   Jenelle Grimm APRN - CNP   sevelamer (RENVELA) 800 MG tablet Take 1 tablet by mouth 3 times daily (with meals) 3 Tablets TID 2/1/23   TELLO Abbott CNP   clopidogrel (PLAVIX) 75 MG tablet Take 1 tablet by mouth daily 2/1/23   TELLO Abbott CNP   b complex-C-folic acid (NEPHROCAPS) 1 MG capsule Take 1 capsule by mouth daily 2/1/23   TELLO Abbott CNP   pantoprazole (PROTONIX) 40 MG tablet Take 1 tablet by mouth daily 2/1/23   TELLO Abbott CNP   vitamin D3 (CHOLECALCIFEROL) 125 MCG (5000 UT) TABS tablet Take 1 tablet by mouth as needed (per hd) 2/1/23   TELLO Abbott CNP   insulin aspart (NOVOLOG FLEXPEN) 100 UNIT/ML injection pen use per sliding scale three times a day with meals. Maximum 30 units/day  Patient taking differently: use per sliding scale QID times a day with meals. Maximum 30 units/day 12/27/22   TELLO Downing CNP   insulin detemir (LEVEMIR FLEXTOUCH) 100 UNIT/ML injection pen Inject 35 Units into the skin nightly 12/27/22   TELLO Downing CNP   Insulin Pen Needle (BD PEN NEEDLE BILL U/F) 32G X 4 MM MISC 1 each by Does not apply route daily 12/27/22   TELLO Downing CNP   albuterol sulfate HFA (VENTOLIN HFA) 108 (90 Base) MCG/ACT inhaler Inhale 1-2 puffs into the lungs every 4 hours as needed for Wheezing or Shortness of Breath 10/25/22   TELLO Downing CNP   BD PEN NEEDLE MICRO U/F 32G X 6 MM MISC  8/31/22   Historical Provider, MD   ACCU-CHEK GUIDE strip USE TO TEST BLOOD SUGAR FOUR TIMES DAILY AND AS NEEDED 5/25/22   Historical Provider, MD   timolol (TIMOPTIC) 0.5 % ophthalmic solution Place 2 drops into both eyes daily 5/12/22   Milas Quant   Accu-Chek Softclix Lancets MISC  4/7/22   Historical Provider, MD   aspirin 81 MG EC tablet Take 81 mg by mouth daily    Historical Provider, MD       Current medications:    No current outpatient medications on file.      No current facility-administered medications for this visit. Allergies: Allergies   Allergen Reactions    Amoxicillin Nausea And Vomiting and Other (See Comments)    Oxycodone Headaches and Other (See Comments)    Oxycodone Hcl Other (See Comments)    Percocet [Oxycodone-Acetaminophen] Other (See Comments)     headaches    Amoxicillin-Pot Clavulanate Nausea And Vomiting and Other (See Comments)    Cephalexin Nausea And Vomiting and Other (See Comments)       Problem List:    Patient Active Problem List   Diagnosis Code    Type 2 diabetes mellitus, with long-term current use of insulin ([facility] [address] E11.9, Z79.4    Hypertension I10    Coronary artery disease involving native coronary artery of native heart with other form of angina pectoris ([address] I25.118    Mixed hyperlipidemia E78.2    CKD (chronic kidney disease) stage 4, GFR 15-29 ml/min (HCC) N18.4    Type 2 myocardial infarction ([address] I21. A1    Anemia D64.9    Cyst of left kidney N28.1    BPH (benign prostatic hyperplasia) N40.0    Chronic vomiting R11.10    RUQ abdominal pain R10.11    Insulin long-term use ([facility]) Z79.4    ESRD (end stage renal disease) ([facility] [place] [address] N18.6    Mechanical complication due to peritoneal dialysis catheter ([facility] [place] [address] T85.691A    S/P primary angioplasty with coronary stent Z95.5       Past Medical History:        Diagnosis Date    Anemia     Blurred vision, right eye     BPH (benign prostatic hyperplasia)     CAD (coronary artery disease) 09/29/2022    [facility] admission, ZAC placed    Cerebral artery occlusion with cerebral infarction ([address]     Chronic vomiting     CKD (chronic kidney disease) stage 4, GFR 15-29 ml/min ([facility])     COVID-19 vaccine administered     Cyst of left kidney     GERD (gastroesophageal reflux disease)     Hemodialysis patient ([address]     peritoneal dialysis [facility]    Hypertension     Injection of surface of eye, right     Dr Jade Palmer    Mixed hyperlipidemia     MRSA (methicillin resistant staph aureus) culture positive 2009   • Pneumonia    • Type 2 diabetes mellitus, with long-term current use of insulin (HCC)    • Under care of team     PCP CARMINA LAUREANO CNP   • Under care of team     nephrology dr Little   • Under care of team     cardiologist dr soto   • Vision loss of right eye        Past Surgical History:        Procedure Laterality Date   • CARDIAC CATHETERIZATION  10/03/2022    ZAC to LAD   • CARDIAC CATHETERIZATION  01/31/2023    ZAC to LCX   • CARPAL TUNNEL RELEASE Left     Nerve Decompression in Left Elbow alsp   • CHOLECYSTECTOMY     • DIALYSIS CATHETER INSERTION N/A 06/21/2022    PERITONEAL DIALYSIS CATHETER PLACEMENT performed by Dayday Weber MD at [facility] OR   • DIALYSIS CATHETER INSERTION N/A 07/18/2022    PERITONEAL DIALYSIS CATHETER REVISION performed by Dayday Weber MD at [facility] OR   • VASCULAR SURGERY N/A 05/26/2022    CATHETER INSERTION HEMODIALYSIS - tunneled cath placement for dialysis performed by Derick Philip MD at [facility] OR       Social History:    Social History     Tobacco Use   • Smoking status: Never   • Smokeless tobacco: Former     Types: Chew     Quit date: 4/7/2019   • Tobacco comments:     never a smoker    Substance Use Topics   • Alcohol use: Never                                Counseling given: Not Answered  Tobacco comments: never a smoker       Vital Signs (Current):   There were no vitals filed for this visit.                                           BP Readings from Last 3 Encounters:   03/09/23 (!) 159/87   02/09/23 133/78   02/01/23 (!) 143/87       NPO Status:                                                                                 BMI:   Wt Readings from Last 3 Encounters:   03/13/23 180 lb (81.6 kg)   03/09/23 191 lb (86.6 kg)   02/09/23 192 lb (87.1 kg)     There is no height or weight on file to calculate BMI.    CBC:   Lab Results   Component Value Date/Time    WBC 7.8 02/01/2023 11:31 AM    RBC 4.97 02/01/2023 11:31 AM    RBC 4.31 09/29/2022 02:15 PM    HGB 14.2  02/01/2023 11:31 AM    HCT 43.7 02/01/2023 11:31 AM    MCV 87.9 02/01/2023 11:31 AM    RDW 14.6 02/01/2023 11:31 AM     02/01/2023 11:31 AM       CMP:   Lab Results   Component Value Date/Time     02/01/2023 07:42 AM    K 4.4 02/01/2023 07:42 AM     02/01/2023 07:42 AM    CO2 18 02/01/2023 07:42 AM    BUN 43 02/01/2023 07:42 AM    CREATININE 6.06 02/01/2023 07:42 AM    LABGLOM 10 02/01/2023 07:42 AM    GLUCOSE 168 02/01/2023 07:42 AM    GLUCOSE 185 12/27/2022 10:49 AM    PROT 6.3 02/01/2023 07:42 AM    PROT 6.6 09/29/2022 02:15 PM    CALCIUM 8.8 02/01/2023 07:42 AM    BILITOT 0.3 02/01/2023 07:42 AM    ALKPHOS 107 02/01/2023 07:42 AM    AST 11 02/01/2023 07:42 AM    ALT 17 02/01/2023 07:42 AM       POC Tests: No results for input(s): POCGLU, POCNA, POCK, POCCL, POCBUN, POCHEMO, POCHCT in the last 72 hours.    Coags:   Lab Results   Component Value Date/Time    APTT 27 09/29/2022 02:15 PM       HCG (If Applicable): No results found for: PREGTESTUR, PREGSERUM, HCG, HCGQUANT     ABGs: No results found for: PHART, PO2ART, VBN4UBX, FMI1ZPK, BEART, C9URQBXE     Type & Screen (If Applicable):  No results found for: LABABO, LABRH    Drug/Infectious Status (If Applicable):  Lab Results   Component Value Date/Time    HEPCAB Nonreactive 05/17/2022 07:22 AM       COVID-19 Screening (If Applicable): No results found for: COVID19    EKG  1/31/2023  Normal sinus rhythm  Left anterior fascicular block  Minimal voltage criteria for LVH, may be normal variant  Abnormal ECG  No previous ECGs available    TTE 1/2023  EF 55%    Anesthesia Evaluation  Patient summary reviewed and Nursing notes reviewed no history of anesthetic complications:   Airway: Mallampati: I  TM distance: >3 FB   Neck ROM: limited  Mouth opening: > = 3 FB   Dental: normal exam         Pulmonary:normal exam  breath sounds clear to auscultation  (+) pneumonia:      (-) not a current smoker                           Cardiovascular:  Exercise  tolerance: no interval change,   (+) hypertension:, angina:, past MI:, CAD:, hyperlipidemia    (-) CABG/stent      Rhythm: regular  Rate: normal                    Neuro/Psych:   Negative Neuro/Psych ROS  (+) CVA:,             GI/Hepatic/Renal:   (+) GERD:, renal disease: ESRD and dialysis,          ROS comment: Dialysis yesterday via rita cath  Chronic vomiting. Endo/Other:    (+) DiabetesType II DM, , blood dyscrasia: anemia:., .                 Abdominal:             Vascular: negative vascular ROS. Other Findings:             Anesthesia Plan      MAC     ASA 3       Induction: intravenous. MIPS: Prophylactic antiemetics administered. Anesthetic plan and risks discussed with patient and spouse. Plan discussed with surgical team and CRNA.                     Deanna Lunsford MD   3/14/2023

## 2023-03-14 NOTE — H&P
History and Physical    Pt Name: Rafael Stevens  MRN: [id number]  YOB: 1970  Date of evaluation: 3/14/2023    SUBJECTIVE:   History of Chief Complaint:    Patient presents preprocedure for vitrectomy. He reports a several month history of blurred vision. Patient says that he had laser treatment in the office, no other eye procedures. He is a diabetic, on peritoneal dialysis. He has been diagnosed with retinal detachment. Past Medical History    has a past medical history of Anemia, Blurred vision, right eye, BPH (benign prostatic hyperplasia), CAD (coronary artery disease), Cerebral artery occlusion with cerebral infarction ([facility] [address] [address] Chronic vomiting, CKD (chronic kidney disease) stage 4, GFR 15-29 ml/min ([facility]), COVID-19 vaccine administered, Cyst of left kidney, GERD (gastroesophageal reflux disease), Hemodialysis patient ([facility] [address] Hypertension, Injection of surface of eye, right, Mixed hyperlipidemia, MRSA (methicillin resistant staph aureus) culture positive, Peritoneal dialysis catheter in place [facility]), Pneumonia, Type 2 diabetes mellitus, with long-term current use of insulin ([facility] [address] Under care of team, Under care of team, Under care of team, and Vision loss of right eye. Past Surgical History   has a past surgical history that includes Cholecystectomy; Carpal tunnel release (Left); vascular surgery (N/A, 05/26/2022); Dialysis Catheter Insertion (N/A, 06/21/2022); Dialysis Catheter Insertion (N/A, 07/18/2022); Cardiac catheterization (10/03/2022); and Cardiac catheterization (01/31/2023). Medications  Prior to Admission medications    Medication Sig Start Date End Date Taking?  Authorizing Provider   Sacubitril-Valsartan (ENTRESTO PO) Take 1 tablet by mouth in the morning and at bedtime ENTRESTO 25-28 MG   Yes Historical Provider, MD   isosorbide mononitrate (IMDUR) 30 MG extended release tablet Take 3 tablets by mouth daily 2/1/23   TELLO Manzanares - CNP   nitroGLYCERIN (NITROSTAT) 0.4 MG SL tablet Place 1 (ONE) tablet under tongue every 5 (FIVE) minutes as needed for Chest pain. max = 3 (THREE) doses.  If chest pain persists after FIRST dose, call 911 2/1/23   TELLO Kessler CNP   atorvastatin (LIPITOR) 80 MG tablet Take 1 tablet by mouth daily 2/1/23   TELLO Kessler CNP   Icosapent Ethyl (VASCEPA) 1 g CAPS capsule Take 2 capsules by mouth 2 times daily  Patient taking differently: Take 1 capsule by mouth daily 2/1/23   TELLO Kessler CNP   carvedilol (COREG) 25 MG tablet Take 1 tablet by mouth 2 times daily 2/1/23   TELLO Kessler CNP   NIFEdipine (ADALAT CC) 90 MG extended release tablet Take 1 tablet by mouth daily  Patient taking differently: Take 90 mg by mouth at bedtime 2/1/23   TELLO Kessler CNP   furosemide (LASIX) 40 MG tablet Take 1 tablet by mouth nightly  Patient taking differently: Take 90 mg by mouth daily 2/1/23   TELOL Kessler CNP   spironolactone (ALDACTONE) 25 MG tablet Take 1 tablet by mouth daily 2/1/23   TELLO Kessler CNP   calcitRIOL (ROCALTROL) 0.5 MCG capsule Take 1 capsule by mouth daily Monday through Friday 2/1/23   TELLO Kessler CNP   sevelamer (RENVELA) 800 MG tablet Take 1 tablet by mouth 3 times daily (with meals) 3 Tablets TID 2/1/23   TELLO Kessler CNP   clopidogrel (PLAVIX) 75 MG tablet Take 1 tablet by mouth daily 2/1/23   TELLO Kessler CNP   b complex-C-folic acid (NEPHROCAPS) 1 MG capsule Take 1 capsule by mouth daily  Patient not taking: Reported on 3/14/2023 2/1/23   TELLO Kessler CNP   pantoprazole (PROTONIX) 40 MG tablet Take 1 tablet by mouth daily 2/1/23   TELLO Kessler CNP   vitamin D3 (CHOLECALCIFEROL) 125 MCG (5000 UT) TABS tablet Take 1 tablet by mouth as needed (per hd) 2/1/23   TELLO Kessler CNP   insulin aspart (NOVOLOG FLEXPEN) 100 UNIT/ML injection pen use per sliding scale three times a day with meals. Maximum 30 units/day  Patient taking differently: use per sliding scale QID times a day with meals. Maximum 30 units/day 12/27/22   TELLO Downing CNP   insulin detemir (LEVEMIR FLEXTOUCH) 100 UNIT/ML injection pen Inject 35 Units into the skin nightly 12/27/22   TELLO Downing CNP   Insulin Pen Needle (BD PEN NEEDLE BILL U/F) 32G X 4 MM MISC 1 each by Does not apply route daily 12/27/22   TELLO Downing CNP   albuterol sulfate HFA (VENTOLIN HFA) 108 (90 Base) MCG/ACT inhaler Inhale 1-2 puffs into the lungs every 4 hours as needed for Wheezing or Shortness of Breath 10/25/22   TELLO Downing CNP   BD PEN NEEDLE MICRO U/F 32G X 6 MM MISC  8/31/22   Historical Provider, MD   ACCU-CHEK GUIDE strip USE TO TEST BLOOD SUGAR FOUR TIMES DAILY AND AS NEEDED 5/25/22   Historical Provider, MD   timolol (TIMOPTIC) 0.5 % ophthalmic solution Place 2 drops into both eyes daily 5/12/22   Duwaine Baljit   Accu-Chek Softclix Lancets MISC  4/7/22   Historical Provider, MD   aspirin 81 MG EC tablet Take 81 mg by mouth daily    Historical Provider, MD     Allergies  is allergic to amoxicillin, oxycodone, oxycodone hcl, percocet [oxycodone-acetaminophen], amoxicillin-pot clavulanate, and cephalexin. Family History  family history includes Colon Cancer in his brother. Social History   reports that he has never smoked. He quit smokeless tobacco use about 3 years ago. His smokeless tobacco use included chew. reports no history of alcohol use. reports no history of drug use.   Marital Status   Occupation disabled    Review of Systems:  CONSTITUTIONAL:   negative for fevers, chills, fatigue and malaise    EYES:   See HPI    HEENT:   negative for tinnitus, epistaxis and sore throat     RESPIRATORY:   negative for cough, shortness of breath, wheezing     CARDIOVASCULAR:   negative for chest pain, palpitations, syncope, edema     GASTROINTESTINAL:   negative for nausea, vomiting     GENITOURINARY:   negative for incontinence     MUSCULOSKELETAL:   negative for neck or back pain     NEUROLOGICAL:   Negative for weakness and tingling  negative for headaches and dizziness     PSYCHIATRIC:   negative for anxiety         OBJECTIVE:   VITALS:  height is 5' 4\" (1.626 m) and weight is 180 lb (81.6 kg). CONSTITUTIONAL:alert & cooperative, no acute distress. Very pleasant and talkative. SKIN:  Warm and dry, no rashes on exposed areas of skin   HEAD:  Normocephalic, atraumatic   EYES: EOMs intact. EARS:  Hearing grossly WNL. NOSE:  Nares patent. No rhinorrhea   MOUTH/THROAT:  benign  NECK:good ROM   LUNGS: Clear to auscultation bilaterally, no wheezes. CARDIOVASCULAR: Heart sounds are normal.  Regular rate and rhythm without murmur. ABDOMEN: soft, non tender, non distended. Peritoneal dialysis catheter in place. EXTREMITIES: positive edema bilateral lower extremities. IMPRESSIONS:   Traction retinal detachment   has a past medical history of Anemia, Blurred vision, right eye, BPH (benign prostatic hyperplasia), CAD (coronary artery disease) (09/29/2022), Cerebral artery occlusion with cerebral infarction [facility]), Chronic vomiting, CKD (chronic kidney disease) stage 4, GFR 15-29 ml/min ([facility] [address] COVID-19 vaccine administered, Cyst of left kidney, GERD (gastroesophageal reflux disease), Hemodialysis patient ([facility] [address] Hypertension, Injection of surface of eye, right, Mixed hyperlipidemia, MRSA (methicillin resistant staph aureus) culture positive (2009), Peritoneal dialysis catheter in place [facility]), Pneumonia, Type 2 diabetes mellitus, with long-term current use of insulin ([facility] [address] Under care of team, Under care of team, Under care of team, and Vision loss of right eye.    PLANS:   Right vitrectomy    VY Mullins PA-C  Electronically signed 3/14/2023 at 7:43 AM

## 2023-05-12 PROBLEM — D64.9 SYMPTOMATIC ANEMIA: Status: ACTIVE | Noted: 2023-05-12

## 2023-05-12 PROBLEM — I50.32 CHRONIC HEART FAILURE WITH PRESERVED EJECTION FRACTION (HCC): Status: ACTIVE | Noted: 2023-05-12

## 2023-05-12 PROBLEM — R42 DIZZINESS: Status: ACTIVE | Noted: 2023-05-12

## 2023-05-16 PROBLEM — I72.4 ANEURYSM OF ARTERY OF LOWER EXTREMITY (HCC): Status: ACTIVE | Noted: 2023-02-10

## 2023-11-09 PROBLEM — F33.0 MAJOR DEPRESSIVE DISORDER, RECURRENT, MILD (HCC): Status: ACTIVE | Noted: 2023-11-09

## 2023-11-22 PROBLEM — I50.32 CHRONIC HEART FAILURE WITH PRESERVED EJECTION FRACTION (HCC): Chronic | Status: ACTIVE | Noted: 2023-05-12

## 2024-02-15 PROBLEM — S92.354D NONDISPLACED FRACTURE OF FIFTH RIGHT METATARSAL BONE WITH ROUTINE HEALING: Status: ACTIVE | Noted: 2024-02-15

## 2024-08-21 PROBLEM — N28.1 COMPLEX RENAL CYST: Status: ACTIVE | Noted: 2024-08-21

## 2025-03-03 PROBLEM — I87.2 CHRONIC VENOUS STASIS DERMATITIS OF BOTH LOWER EXTREMITIES: Status: ACTIVE | Noted: 2023-02-10

## 2025-03-03 PROBLEM — I50.23 ACUTE ON CHRONIC HFREF (HEART FAILURE WITH REDUCED EJECTION FRACTION) (HCC): Status: ACTIVE | Noted: 2024-10-10

## 2025-03-03 PROBLEM — D69.6 THROMBOCYTOPENIA: Status: ACTIVE | Noted: 2025-02-18

## 2025-03-03 PROBLEM — Z95.5 PRESENCE OF CORONARY ANGIOPLASTY IMPLANT AND GRAFT: Status: ACTIVE | Noted: 2024-08-29

## 2025-03-03 PROBLEM — N25.81 SECONDARY HYPERPARATHYROIDISM OF RENAL ORIGIN: Status: ACTIVE | Noted: 2024-06-07

## 2025-03-03 PROBLEM — I50.21 ACUTE SYSTOLIC HEART FAILURE (HCC): Status: ACTIVE | Noted: 2024-10-10

## 2025-03-03 PROBLEM — I50.9 CHF (CONGESTIVE HEART FAILURE) (HCC): Status: ACTIVE | Noted: 2024-06-07

## 2025-03-03 PROBLEM — I63.9 CVA (CEREBRAL VASCULAR ACCIDENT) (HCC): Status: ACTIVE | Noted: 2024-06-07

## 2025-03-03 PROBLEM — I25.5 ISCHEMIC CARDIOMYOPATHY: Status: ACTIVE | Noted: 2024-06-07

## 2025-03-03 PROBLEM — E11.21 DIABETIC RENAL DISEASE (HCC): Status: ACTIVE | Noted: 2025-03-03

## 2025-03-03 PROBLEM — E11.42 DIABETIC PERIPHERAL NEUROPATHY ASSOCIATED WITH TYPE 2 DIABETES MELLITUS (HCC): Status: ACTIVE | Noted: 2025-03-03

## 2025-05-21 ENCOUNTER — RESULTS FOLLOW-UP (OUTPATIENT)
Dept: UROLOGY | Age: 55
End: 2025-05-21

## (undated) DEVICE — CAUTERY BPLR 25GA INTOCU W/ HNDPC CRD DISP FOR CX9404

## (undated) DEVICE — SYRINGE ST FILTERS W/MCE MEMBRAN

## (undated) DEVICE — CYCLOGEL 1% GTTS

## (undated) DEVICE — 25 GA FLEXIBLE TIP LASER PROBE: Brand: ALCON, ENGAUGE

## (undated) DEVICE — SURGICAL PROCEDURE PACK 25 GA VITRECTOMY

## (undated) DEVICE — GLOVE SURG SZ 65 THK91MIL LTX FREE SYN POLYISOPRENE

## (undated) DEVICE — SUTURE PLN GUT SZ 6-0 L18IN ABSRB YELLOWISH TAN L6.5MM 1735G

## (undated) DEVICE — RETINA PK

## (undated) DEVICE — 25GA EZPASS SOFT TIP CANNULA BOX OF 5: Brand: VORTEX SURGICAL INC

## (undated) DEVICE — OCCUCOAT SYRINGE 1ML 6PK: Brand: OCCUCOAT

## (undated) DEVICE — SYRINGE, LUER LOCK, 10ML: Brand: MEDLINE

## (undated) DEVICE — STANDARD HYPODERMIC NEEDLE,ALUMINUM HUB: Brand: MONOJECT

## (undated) DEVICE — 1060 S-DRAPE SPCL INCISE 10/BX 4BX/CS: Brand: STERI-DRAPE™

## (undated) DEVICE — GLOVE ORANGE PI 7   MSG9070

## (undated) DEVICE — 1 EACH 40411 STERILE DISPOSABLE SUPER VIEW® LENS SET & 1 EACH 40100 STERILE MICROSCOPE DRAPE: Brand: SUPER VIEW® PACK

## (undated) DEVICE — SYRINGE MED 5ML STD CLR PLAS LUERLOCK TIP N CTRL DISP

## (undated) DEVICE — GOWN,AURORA,NONREINFORCED,LARGE: Brand: MEDLINE